# Patient Record
Sex: FEMALE | Race: WHITE | NOT HISPANIC OR LATINO | Employment: FULL TIME | URBAN - METROPOLITAN AREA
[De-identification: names, ages, dates, MRNs, and addresses within clinical notes are randomized per-mention and may not be internally consistent; named-entity substitution may affect disease eponyms.]

---

## 2017-02-23 ENCOUNTER — HOSPITAL ENCOUNTER (EMERGENCY)
Facility: OTHER | Age: 42
Discharge: HOME | End: 2017-02-23
Attending: EMERGENCY MEDICINE
Payer: COMMERCIAL

## 2017-02-23 VITALS
OXYGEN SATURATION: 100 % | DIASTOLIC BLOOD PRESSURE: 105 MMHG | RESPIRATION RATE: 20 BRPM | TEMPERATURE: 99 F | SYSTOLIC BLOOD PRESSURE: 190 MMHG | HEART RATE: 78 BPM

## 2017-02-23 DIAGNOSIS — S05.02XA CONJUNCTIVAL ABRASION, LEFT, INITIAL ENCOUNTER: Primary | ICD-10-CM

## 2017-02-23 PROCEDURE — 99283 EMERGENCY DEPT VISIT LOW MDM: CPT

## 2017-02-23 PROCEDURE — 90471 IMMUNIZATION ADMIN: CPT | Performed by: EMERGENCY MEDICINE

## 2017-02-23 PROCEDURE — 63600175 PHARM REV CODE 636 W HCPCS: Performed by: EMERGENCY MEDICINE

## 2017-02-23 PROCEDURE — 25000003 PHARM REV CODE 250: Performed by: EMERGENCY MEDICINE

## 2017-02-23 PROCEDURE — 90715 TDAP VACCINE 7 YRS/> IM: CPT | Performed by: EMERGENCY MEDICINE

## 2017-02-23 PROCEDURE — 25000003 PHARM REV CODE 250

## 2017-02-23 RX ORDER — GENTAMICIN SULFATE 3 MG/ML
1 SOLUTION/ DROPS OPHTHALMIC
Status: COMPLETED | OUTPATIENT
Start: 2017-02-23 | End: 2017-02-23

## 2017-02-23 RX ORDER — ZOLPIDEM TARTRATE 5 MG/1
5 TABLET ORAL NIGHTLY PRN
COMMUNITY
End: 2017-08-29 | Stop reason: DRUGHIGH

## 2017-02-23 RX ORDER — AMLODIPINE BESYLATE 5 MG/1
5 TABLET ORAL DAILY
COMMUNITY
End: 2017-06-04 | Stop reason: ALTCHOICE

## 2017-02-23 RX ORDER — ATORVASTATIN CALCIUM 10 MG/1
10 TABLET, FILM COATED ORAL DAILY
COMMUNITY

## 2017-02-23 RX ORDER — ESCITALOPRAM OXALATE 20 MG/1
20 TABLET ORAL DAILY
COMMUNITY

## 2017-02-23 RX ORDER — PROPARACAINE HYDROCHLORIDE 5 MG/ML
1 SOLUTION/ DROPS OPHTHALMIC
Status: COMPLETED | OUTPATIENT
Start: 2017-02-23 | End: 2017-02-23

## 2017-02-23 RX ORDER — PROPARACAINE HYDROCHLORIDE 5 MG/ML
SOLUTION/ DROPS OPHTHALMIC
Status: COMPLETED
Start: 2017-02-23 | End: 2017-02-23

## 2017-02-23 RX ORDER — CETIRIZINE HYDROCHLORIDE 5 MG/1
5 TABLET ORAL DAILY
COMMUNITY

## 2017-02-23 RX ADMIN — CLOSTRIDIUM TETANI TOXOID ANTIGEN (FORMALDEHYDE INACTIVATED), CORYNEBACTERIUM DIPHTHERIAE TOXOID ANTIGEN (FORMALDEHYDE INACTIVATED), BORDETELLA PERTUSSIS TOXOID ANTIGEN (GLUTARALDEHYDE INACTIVATED), BORDETELLA PERTUSSIS FILAMENTOUS HEMAGGLUTININ ANTIGEN (FORMALDEHYDE INACTIVATED), BORDETELLA PERTUSSIS PERTACTIN ANTIGEN, AND BORDETELLA PERTUSSIS FIMBRIAE 2/3 ANTIGEN 0.5 ML: 5; 2; 2.5; 5; 3; 5 INJECTION, SUSPENSION INTRAMUSCULAR at 09:02

## 2017-02-23 RX ADMIN — GENTAMICIN SULFATE 1 DROP: 3 SOLUTION/ DROPS OPHTHALMIC at 10:02

## 2017-02-23 RX ADMIN — PROPARACAINE HYDROCHLORIDE: 5 SOLUTION/ DROPS OPHTHALMIC at 10:02

## 2017-02-23 RX ADMIN — FLUORESCEIN SODIUM: 1 STRIP OPHTHALMIC at 10:02

## 2017-02-23 RX ADMIN — PROPARACAINE HYDROCHLORIDE 1 DROP: 5 SOLUTION/ DROPS OPHTHALMIC at 10:02

## 2017-02-23 NOTE — ED AVS SNAPSHOT
[facility] EMERGENCY DEPARTMENT  [address]  [place] [address]               Reema Barrios   2017  9:22 PM   ED    Description:  Female : 1975   Department:  [facility] Emergency Department           Your Care was Coordinated By:     Provider Role From To    Nargis Smith MD Attending Provider 17 2163 --      Reason for Visit     Eye Injury           Diagnoses this Visit        Comments    Conjunctival abrasion, left, initial encounter    -  Primary       ED Disposition     ED Disposition Condition Comment    Discharge             To Do List           Follow-up Information     Follow up with Your ophthalmologist. Schedule an appointment as soon as possible for a visit in 2 days.    Why:  For recheck      [facility] On Call     [facility] On Call Nurse Care Line -  Assistance  Registered nurses in the [facility] On Call Center provide clinical advisement, health education, appointment booking, and other advisory services.  Call for this free service at [phone].             Medications           Message regarding Medications     Verify the changes and/or additions to your medication regime listed below are the same as discussed with your clinician today.  If any of these changes or additions are incorrect, please notify your healthcare provider.        These medications were administered today        Dose Freq    Tdap vaccine injection 0.5 mL 0.5 mL vaccine x 1 dose    Sig: Inject 0.5 mLs into the muscle vaccine x 1 dose for Meets Vaccination Criteria.    Class: Normal    Route: Intramuscular    fluorescein (FLUOR-I-STRIPS A.T.) 1 mg ophthalmic strip      Notes to Pharmacy: Created by cabinet override    proparacaine (ALCAINE) 0.5 % ophthalmic solution      Notes to Pharmacy: Created by cabinet override    proparacaine 0.5 % ophthalmic solution 1 drop 1 drop ED 1 Time    Sig: Place 1 drop into the left eye ED 1 Time.    Class: Normal    Route: Left Eye    fluorescein  ophthalmic strip 1 strip 1 strip ED 1 Time    Sig: Place 1 strip into the left eye ED 1 Time.    Class: Normal    Route: Left Eye    fluorescein (FLUOR-I-STRIPS A.T.) 1 mg ophthalmic strip      Notes to Pharmacy: Created by cabinet override    gentamicin 0.3 % ophthalmic solution 1 drop 1 drop ED 1 Time    Sig: Place 1 drop into the left eye ED 1 Time.    Class: Normal    Route: Left Eye           Verify that the below list of medications is an accurate representation of the medications you are currently taking.  If none reported, the list may be blank. If incorrect, please contact your healthcare provider. Carry this list with you in case of emergency.           Current Medications     amlodipine (NORVASC) 5 MG tablet Take 5 mg by mouth once daily.    atorvastatin (LIPITOR) 10 MG tablet Take 10 mg by mouth once daily.    cetirizine (ZYRTEC) 5 MG tablet Take 5 mg by mouth once daily.    escitalopram oxalate (LEXAPRO) 20 MG tablet Take 20 mg by mouth once daily.    zolpidem (AMBIEN) 5 MG Tab Take 5 mg by mouth nightly as needed.    fluorescein (FLUOR-I-STRIPS A.T.) 1 mg ophthalmic strip     gentamicin 0.3 % ophthalmic solution 1 drop Place 1 drop into the left eye ED 1 Time.    proparacaine 0.5 % ophthalmic solution 1 drop Place 1 drop into the left eye ED 1 Time.    Tdap vaccine injection 0.5 mL Inject 0.5 mLs into the muscle vaccine x 1 dose for Meets Vaccination Criteria.           Clinical Reference Information           Your Vitals Were     BP                   190/105           Allergies as of 2/23/2017     No Known Allergies      Immunizations Administered on Date of Encounter - 2/23/2017     Name Date Dose VIS Date Route    TDAP  Incomplete 0.5 mL 2/24/2015 Intramuscular    TDAP 2/23/2017 0.5 mL 2/24/2015 Intramuscular      ED Micro, Lab, POCT     None      ED Imaging Orders     None        Discharge Instructions       Garamycin drops one drop to the left eye 4 times a day for the next 5-7 days.  Appointment  with Your Ophthalmologist the next day or two for recheck.  Return for worsening condition.  He can patch the eye for the next day or 2 as needed for comfort.    Discharge References/Attachments     CONJUNCTIVAL FOREIGN BODY, RESOLVED (ENGLISH)      [facility] Sign-Up     Activating your [facility] account is as easy as 1-2-3!     1) Visit [email or web address], select Sign Up Now, enter this activation code and your date of birth, then select Next.  [email or web address]  Expires: 4/9/2017 10:50 PM      2) Create a username and password to use when you visit [facility] in the future and select a security question in case you lose your password and select Next.    3) Enter your e-mail address and click Sign Up!    Additional Information  If you have questions, please e-mail [email or web address] or call [phone] to talk to our [facility] staff. Remember, [facility] is NOT to be used for urgent needs. For medical emergencies, dial 911.          [facility] Emergency Department complies with applicable Federal civil rights laws and does not discriminate on the basis of race, color, national origin, age, disability, or sex.        Language Assistance Services     ATTENTION: Language assistance services are available, free of charge. Please call [phone].      ATENCIÓN: Si habla español, tiene a ortiz disposición servicios gratuitos de asistencia lingüística. Llame al [phone].     CHÚ Ý: N?u b?n nói Ti?ng Vi?t, có các d?ch v? h? tr? ngôn ng? mi?n phí dành cho b?n. G?i s? [phone].

## 2017-02-24 NOTE — ED PROVIDER NOTES
Encounter Date: 2017       History     Chief Complaint   Patient presents with    Eye Injury     Review of patient's allergies indicates:  No Known Allergies  HPI Comments: 1-year-old female reports her left eye was irritated and when she rubbed it she thinks she scratched the eyeball, because the conjunctiva became very swollen.  She reports she was able take her contact out of that eye without any difficulty.  Mild blurred vision.  No bleeding.  Last tetanus is unknown.    Patient is a 41 y.o. female presenting with the following complaint: eye pain. The history is provided by the patient.   Eye Pain    This is a new problem. The current episode started just prior to arrival. The left eye is affected. The injury mechanism was a direct trauma. There is history of trauma to the eye. She wears contacts. Associated symptoms include blurred vision, discharge and itching. Pertinent negatives include no double vision, no foreign body sensation, no photophobia, no eye redness and no nausea.     Past Medical History:   Diagnosis Date    Anxiety     DDD (degenerative disc disease), cervical     High cholesterol     Hypertension     Insomnia      Past Surgical History:   Procedure Laterality Date    CARPAL TUNNEL RELEASE Bilateral      SECTION       History reviewed. No pertinent family history.  Social History   Substance Use Topics    Smoking status: Never Smoker    Smokeless tobacco: None    Alcohol use No     Review of Systems   Constitutional: Negative for chills and fever.   Eyes: Positive for blurred vision, pain and discharge. Negative for double vision, photophobia and redness.   Gastrointestinal: Negative for nausea.   Skin: Positive for itching.       Physical Exam   Initial Vitals   BP Pulse Resp Temp SpO2   17 2123 17 2123 173 17   190/105 78 20 98.8 °F (37.1 °C) 100 %     Physical Exam    Nursing note and vitals reviewed.  Constitutional:  She appears well-developed and well-nourished. She is cooperative.   HENT:   Head: Normocephalic.   Eyes: EOM and lids are normal. Pupils are equal, round, and reactive to light. Right conjunctiva is not injected. Right conjunctiva has no hemorrhage. Left conjunctiva is not injected. Left conjunctiva has no hemorrhage.   Slit lamp exam:       The left eye shows no corneal abrasion, no foreign body and no fluorescein uptake.       Neck: Phonation normal. Neck supple.   Neurological: She is alert.   Skin: Skin is warm and dry.         ED Course   Procedures  Labs Reviewed - No data to display                            ED Course     Clinical Impression:   The encounter diagnosis was Conjunctival abrasion, left, initial encounter.    Disposition:   Disposition: Discharged  Condition: Stable       Nargis Smith MD  02/24/17 0353

## 2017-02-24 NOTE — DISCHARGE INSTRUCTIONS
Garamycin drops one drop to the left eye 4 times a day for the next 5-7 days.  Appointment with Your Ophthalmologist the next day or two for recheck.  Return for worsening condition.  He can patch the eye for the next day or 2 as needed for comfort.

## 2017-02-24 NOTE — ED NOTES
Registration reports pt with eye injury / nurse examined in waiting room and moved pt to room #2 for MD evaluation due to swelling of left eye covering (OS)

## 2017-06-04 ENCOUNTER — HOSPITAL ENCOUNTER (EMERGENCY)
Facility: HOSPITAL | Age: 42
Discharge: HOME | End: 2017-06-04
Attending: EMERGENCY MEDICINE | Admitting: EMERGENCY MEDICINE
Payer: COMMERCIAL

## 2017-06-04 VITALS
TEMPERATURE: 98 F | HEART RATE: 100 BPM | WEIGHT: 225 LBS | DIASTOLIC BLOOD PRESSURE: 86 MMHG | SYSTOLIC BLOOD PRESSURE: 128 MMHG | OXYGEN SATURATION: 99 % | BODY MASS INDEX: 33.33 KG/M2 | HEIGHT: 69 IN | RESPIRATION RATE: 20 BRPM
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
## 2017-06-04 DIAGNOSIS — S82.891A MALLEOLAR FRACTURE, RIGHT, CLOSED, INITIAL ENCOUNTER: Primary | ICD-10-CM

## 2017-06-04 DIAGNOSIS — S99.919A ANKLE INJURY: ICD-10-CM

## 2017-06-04 DIAGNOSIS — M79.673 FOOT PAIN: ICD-10-CM

## 2017-06-04 PROCEDURE — 29515 APPLICATION SHORT LEG SPLINT: CPT | Mod: RT

## 2017-06-04 PROCEDURE — 27767 CLTX POST ANKLE FX: CPT | Mod: 54,,, | Performed by: EMERGENCY MEDICINE

## 2017-06-04 PROCEDURE — 99284 EMERGENCY DEPT VISIT MOD MDM: CPT | Mod: 25

## 2017-06-04 PROCEDURE — 25000003 PHARM REV CODE 250: Performed by: PHYSICIAN ASSISTANT

## 2017-06-04 PROCEDURE — 99284 EMERGENCY DEPT VISIT MOD MDM: CPT | Mod: 57,,, | Performed by: PHYSICIAN ASSISTANT

## 2017-06-04 RX ORDER — LISINOPRIL 10 MG/1
10 TABLET ORAL DAILY
COMMUNITY
End: 2017-08-29 | Stop reason: ALTCHOICE

## 2017-06-04 RX ORDER — HYDROCODONE BITARTRATE AND ACETAMINOPHEN 5; 325 MG/1; MG/1
1 TABLET ORAL
Status: COMPLETED | OUTPATIENT
Start: 2017-06-04 | End: 2017-06-04

## 2017-06-04 RX ORDER — HYDROCODONE BITARTRATE AND ACETAMINOPHEN 5; 325 MG/1; MG/1
1 TABLET ORAL EVERY 4 HOURS PRN
Qty: 15 TABLET | Refills: 0 | Status: SHIPPED | OUTPATIENT
Start: 2017-06-04 | End: 2017-08-29

## 2017-06-04 RX ADMIN — HYDROCODONE BITARTRATE AND ACETAMINOPHEN 1 TABLET: 5; 325 TABLET ORAL at 03:06

## 2017-06-04 NOTE — ED NOTES
Patient identifiers verified and correct for Reema Barrios.    LOC: The patient is awake, alert and aware of environment with an appropriate affect, the patient is oriented x 3 and speaking appropriately.  APPEARANCE: Patient resting comfortably and in no acute distress, patient is clean and well groomed, patient's clothing is properly fastened.  SKIN: The skin is warm and dry, color consistent with ethnicity, patient has normal skin turgor and moist mucus membranes, skin intact, no breakdown or bruising noted.  MUSCULOSKELETAL: Patient moving all extremities spontaneously except for right ankle which has bruising and swelling from fall - limited ROM secondary to pain.   RESPIRATORY: Airway is open and patent, respirations are spontaneous, patient has a normal effort and rate, no accessory muscle use noted, bilateral breath sounds clear.  CARDIAC: Patient has a normal rate and regular rhythm, no periphreal edema noted, capillary refill < 3 seconds.  ABDOMEN: Soft and non tender to palpation, no distention noted, normoactive bowel sounds present in all four quadrants.  NEUROLOGIC: PERRL, 3 mm bilaterally, eyes open spontaneously, behavior appropriate to situation, follows commands, facial expression symmetrical, bilateral hand grasp equal and even, purposeful motor response noted, normal sensation in all extremities when touched with a finger.

## 2017-06-04 NOTE — ED TRIAGE NOTES
Pt was walking down staircase and stopped for a person in front of her and she fell backwards onto right foot. Swelling and bruising noted. Unable to bear weight. Good pedal pulses and sensation noted.

## 2017-06-04 NOTE — ED PROVIDER NOTES
Encounter Date: 2017       History     Chief Complaint   Patient presents with    Ankle Pain     right ankle; fell on it      Review of patient's allergies indicates:  No Known Allergies  Patient is a 41-year-old female with history of hypertension and hyperlipidemia who presents to the emergency department with right ankle pain.  Patient states she was at a dance revue with her daughter.  Patient states she was walking on the stairs, when she tripped falling onto her ankle.  Patient states she hurt her ankle pop.  Patient reports swelling and pain to the right ankle.  Patient states she can wiggle her toes, but there is pain with bearing weight.  Patient denies falling or hitting her head.  Patient denies any other injury.      The history is provided by the patient.     Past Medical History:   Diagnosis Date    Anxiety     DDD (degenerative disc disease), cervical     High cholesterol     Hypertension     Insomnia      Past Surgical History:   Procedure Laterality Date    CARPAL TUNNEL RELEASE Bilateral      SECTION       History reviewed. No pertinent family history.  Social History   Substance Use Topics    Smoking status: Never Smoker    Smokeless tobacco: Not on file    Alcohol use No     Review of Systems   Constitutional: Negative for activity change, appetite change, chills, fatigue and fever.   HENT: Negative for congestion, dental problem, facial swelling, nosebleeds, postnasal drip, sore throat and trouble swallowing.    Respiratory: Negative for cough and shortness of breath.    Cardiovascular: Negative for chest pain.   Gastrointestinal: Negative for abdominal pain, blood in stool, constipation, diarrhea, nausea and vomiting.   Genitourinary: Negative for dysuria, flank pain and hematuria.   Musculoskeletal: Negative for back pain, neck pain and neck stiffness.        Ankle pain   Skin: Negative for rash and wound.   Neurological: Negative for dizziness, speech difficulty,  weakness, light-headedness, numbness and headaches.       Physical Exam     Initial Vitals [06/04/17 1503]   BP Pulse Resp Temp SpO2   128/86 100 20 97.9 °F (36.6 °C) 99 %     Physical Exam    Nursing note and vitals reviewed.  Constitutional: She appears well-developed and well-nourished. She is not diaphoretic.  Non-toxic appearance. No distress.   HENT:   Head: Normocephalic.   Right Ear: Hearing and external ear normal.   Left Ear: Hearing and external ear normal.   Nose: Nose normal.   Mouth/Throat: Uvula is midline and oropharynx is clear and moist. No trismus in the jaw. No uvula swelling. No oropharyngeal exudate.   Eyes: Conjunctivae are normal. Pupils are equal, round, and reactive to light.   Neck: Normal range of motion.   Cardiovascular: Normal rate and regular rhythm.   Pulmonary/Chest: Breath sounds normal. No respiratory distress. She has no wheezes. She has no rhonchi. She has no rales. She exhibits no tenderness.   Abdominal: Soft. Bowel sounds are normal. She exhibits no distension and no mass. There is no tenderness. There is no rebound and no guarding.   Musculoskeletal:        Right ankle: She exhibits decreased range of motion and swelling. Tenderness. Lateral malleolus and medial malleolus tenderness found. No head of 5th metatarsal and no proximal fibula tenderness found. Achilles tendon exhibits normal Logan's test results.   Lymphadenopathy:     She has no cervical adenopathy.   Neurological: She is alert and oriented to person, place, and time.   Skin: Skin is warm and dry. Capillary refill takes less than 2 seconds.   Psychiatric: She has a normal mood and affect.         ED Course   Splint Application  Date/Time: 6/4/2017 4:33 PM  Performed by: JENNIFER FLORES  Authorized by: HUGO METZGER   Splint type: ankle stirrup  Post-procedure: The splinted body part was neurovascularly unchanged following the procedure.  Patient tolerance: Patient tolerated the  procedure well with no immediate complications        Labs Reviewed - No data to display       X-Rays:   Independently Interpreted Readings:   Other Readings:  Posterior malleolar fracture    Imaging Results          X-Ray Ankle Complete Right (Final result)  Result time 06/04/17 16:28:13    Final result by Dontae Yusuf III, MD (06/04/17 16:28:13)                 Narrative:    3 views: There is a fracture of the posterior malleolus.  There is DJD and Spurs on the calcaneus.      Electronically signed by: DONTAE YUSUF  Date:     06/04/17  Time:    16:28                              X-Ray Foot Complete Right (Final result)  Result time 06/04/17 16:28:29    Final result by Dontae Yusuf III, MD (06/04/17 16:28:29)                 Narrative:    3 views: There is a posterior malleolar fracture, DJD, and spurs on the calcaneus.  There is a hallux valgus deformity.      Electronically signed by: DONTAE YUSUF  Date:     06/04/17  Time:    16:28                               Medical Decision Making:   Initial Assessment:   Urgent evaluation of a 41-year-old female with history of hypertension and hyperlipidemia who presents to the emergency department with ankle pain.  Patient is afebrile, nontoxic appearing, and hemodynamically stable.  On exam, there is swelling to the right ankle.  There is tenderness over both malleoli.  X-rays will be obtained.  Patient be given analgesic.  ED Management:  4:33 PM  X-rays reveal posterior malleolar fracture.  Patient is placed in ankle stirrup splint.  Patient is given crutches.  She is advised to follow-up with Ortho Evra return to the emergency department with any worsening symptoms or concerns.  Other:   I have discussed this case with another health care provider.       <> Summary of the Discussion: Dr. Carrillo              Attending Attestation:     Physician Attestation Statement for NP/PA:   I discussed this assessment and plan of this patient with the NP/PA, but I did  not personally examine the patient. The face to face encounter was performed by the NP/PA.                  ED Course     Clinical Impression:   The primary encounter diagnosis was Malleolar fracture, right, closed, initial encounter. Diagnoses of Ankle injury and Foot pain were also pertinent to this visit.    Disposition:   Disposition: Discharged  Condition: Stable       Salud Gutiérrez PA-C  06/04/17 1636       Refugio Rebolledo MD  06/21/17 1532

## 2017-08-20 ENCOUNTER — DOCUMENTATION ONLY (OUTPATIENT)
Dept: BARIATRICS | Facility: CLINIC | Age: 42
End: 2017-08-20

## 2017-08-20 ENCOUNTER — TELEPHONE (OUTPATIENT)
Dept: BARIATRICS | Facility: CLINIC | Age: 42
End: 2017-08-20

## 2017-08-20 NOTE — PROGRESS NOTES
Bariatric Surgery Online Course Form Submission  Someone has submitted a Bariatric Surgery Online Course Form Submission on this page.  Date: 2017-08-20 - 14:45  Patient's Name: Reema Barrios  YOB: 1975 Email: [email or web address]  Phone: [phone]   Patient Address: [address] [place] [address]  [place] [id number]  Preferred Surgical Location: [facility]   I certify that I am 18 years of age or older:   Confirmation Code: [id number]  Verification of Bariatric Seminar: y  Insurance Information  Insurance or Self Pay? Insurance - Fill out fields below  Insurance Company Name: Blue Cross Blue Shield   Type of Coverage/Coverage Plan (i.e. PPO, HMO): PPO   Group Name: [employer]   Subscriber Name: Kyle [place]   Member Name (patient's name)   Member ID/Policy #:[id number]   Plan Effective Date: 2008  Card Issuance date:

## 2017-08-20 NOTE — TELEPHONE ENCOUNTER
TTP and obtained ht/wt and she's bordering criteria as unsure of exact height.  If taller then no criteria for surgery and medical wt loss can be offered. Pt . Understands we'll need to weigh and measure her ht on arrival.

## 2017-08-29 ENCOUNTER — OFFICE VISIT (OUTPATIENT)
Dept: BARIATRICS | Facility: CLINIC | Age: 42
End: 2017-08-29
Payer: COMMERCIAL

## 2017-08-29 VITALS
HEART RATE: 76 BPM | BODY MASS INDEX: 34.68 KG/M2 | HEIGHT: 68 IN | SYSTOLIC BLOOD PRESSURE: 135 MMHG | WEIGHT: 228.81 LBS | DIASTOLIC BLOOD PRESSURE: 83 MMHG

## 2017-08-29 DIAGNOSIS — Z01.811 PRE-OP CHEST EXAM: ICD-10-CM

## 2017-08-29 DIAGNOSIS — R40.0 DAYTIME SOMNOLENCE: Primary | ICD-10-CM

## 2017-08-29 DIAGNOSIS — E66.01 SEVERE OBESITY (BMI 35.0-39.9) WITH COMORBIDITY: ICD-10-CM

## 2017-08-29 DIAGNOSIS — E78.2 MIXED HYPERLIPIDEMIA: ICD-10-CM

## 2017-08-29 DIAGNOSIS — I10 ESSENTIAL HYPERTENSION: ICD-10-CM

## 2017-08-29 DIAGNOSIS — R12 HEARTBURN: ICD-10-CM

## 2017-08-29 DIAGNOSIS — E28.2 PCOS (POLYCYSTIC OVARIAN SYNDROME): ICD-10-CM

## 2017-08-29 PROCEDURE — 3008F BODY MASS INDEX DOCD: CPT | Mod: S$GLB,,, | Performed by: PHYSICIAN ASSISTANT

## 2017-08-29 PROCEDURE — 99204 OFFICE O/P NEW MOD 45 MIN: CPT | Mod: S$GLB,,, | Performed by: PHYSICIAN ASSISTANT

## 2017-08-29 PROCEDURE — 99999 PR PBB SHADOW E&M-EST. PATIENT-LVL V: CPT | Mod: PBBFAC,,, | Performed by: PHYSICIAN ASSISTANT

## 2017-08-29 RX ORDER — LOSARTAN POTASSIUM 50 MG/1
50 TABLET ORAL DAILY
COMMUNITY
Start: 2017-07-28

## 2017-08-29 RX ORDER — ZOLPIDEM TARTRATE 10 MG/1
10 TABLET ORAL NIGHTLY
COMMUNITY
Start: 2017-08-23

## 2017-08-29 RX ORDER — OMEPRAZOLE 20 MG/1
20 CAPSULE, DELAYED RELEASE ORAL DAILY PRN
COMMUNITY
Start: 2017-06-01

## 2017-08-29 NOTE — PROGRESS NOTES
BARIATRIC NEW PATIENT EVALUATION    CHIEF COMPLAINT:   Severe obesity Body mass index is 35.05 kg/m². and inability to maintain weight loss.    HISTORY OF PRESENT ILLNESS:  Reema Barrios  is a 42 y.o. female presenting for severe obesity Body mass index is 35.05 kg/m². and inability to maintain weight loss. The patient has tried Weight Watchers, and diet and exercise.  She reports that she has been overweight since childhood. She reports that she has steadily gained weight over the years.  She reports that she only been able to lose about 30-40 pounds with diet and exercise.  She is currently at her heaviest adult weight and has had maintained this weight for the past 9 years (after her second child).        PAST MEDICAL HISTORY:  Past Medical History:   Diagnosis Date    Anxiety     DDD (degenerative disc disease), cervical     Depression     Heartburn     about every 2 weeks    High cholesterol     Hypertension     Insomnia          PAST SURGICAL HISTORY:  Past Surgical History:   Procedure Laterality Date    CARPAL TUNNEL RELEASE Bilateral      SECTION      x2    INGUINAL HERNIA REPAIR Bilateral     TUBAL LIGATION         FAMILY HISTORY:  Family History   Problem Relation Age of Onset    Hypertension Mother     Hyperlipidemia Mother     Cirrhosis Father     Heart disease Maternal Grandmother     Heart disease Maternal Grandfather     Tuberculosis Paternal Grandmother     Diabetes Paternal Grandfather        SOCIAL HISTORY:  Social History     Social History    Marital status:      Spouse name: N/A    Number of children: N/A    Years of education: N/A     Occupational History    Not on file.     Social History Main Topics    Smoking status: Never Smoker    Smokeless tobacco: Never Used    Alcohol use No    Drug use: No    Sexual activity: Yes     Partners: Male     Birth control/ protection: See Surgical Hx     Other Topics Concern    Not on file     Social  "History Narrative    No narrative on file       MEDICATIONS:  Current Outpatient Prescriptions   Medication Sig Dispense Refill    atorvastatin (LIPITOR) 10 MG tablet Take 10 mg by mouth once daily.      cetirizine (ZYRTEC) 5 MG tablet Take 5 mg by mouth once daily.      escitalopram oxalate (LEXAPRO) 20 MG tablet Take 20 mg by mouth once daily.      losartan (COZAAR) 50 MG tablet 50 mg once daily.       omeprazole (PRILOSEC) 20 MG capsule Take 20 mg by mouth daily as needed.       zolpidem (AMBIEN) 10 mg Tab Take 10 mg by mouth every evening.        No current facility-administered medications for this visit.        ALLERGIES:  Review of patient's allergies indicates:   Allergen Reactions    Percocet [oxycodone-acetaminophen] Itching       ROS:  Review of Systems   Constitutional: Positive for malaise/fatigue. Negative for chills, diaphoresis, fever and weight loss.   Eyes: Negative for blurred vision, double vision and pain.   Respiratory: Negative for cough, shortness of breath and wheezing.    Cardiovascular: Negative for chest pain, palpitations and leg swelling.   Gastrointestinal: Negative for abdominal pain, blood in stool, constipation, diarrhea, heartburn, melena, nausea and vomiting.   Genitourinary: Negative for dysuria, frequency and hematuria.   Skin: Negative for itching and rash.   Neurological: Negative for weakness and headaches.   Psychiatric/Behavioral: Negative for depression, hallucinations, memory loss, substance abuse and suicidal ideas. The patient has insomnia. The patient is not nervous/anxious.        PE:  Vitals:    08/29/17 1253   BP: 135/83   Pulse: 76   Weight: 103.8 kg (228 lb 13.4 oz)   Height: 5' 7.75" (1.721 m)       Physical Exam   Constitutional: She is oriented to person, place, and time. She appears well-developed and well-nourished. No distress.   Morbidly obese   HENT:   Head: Normocephalic and atraumatic.   Eyes: Conjunctivae are normal. Right eye exhibits no " discharge. Left eye exhibits no discharge. No scleral icterus.   Neck: Neck supple.   Cardiovascular: Normal rate, regular rhythm and normal heart sounds.  Exam reveals no gallop and no friction rub.    No murmur heard.  Pulmonary/Chest: Effort normal and breath sounds normal. No respiratory distress. She has no wheezes. She has no rales.   Abdominal: Soft. Bowel sounds are normal. She exhibits no distension and no mass. There is no tenderness. There is no rebound and no guarding. No hernia.   Musculoskeletal: Normal range of motion. She exhibits no edema.   Neurological: She is alert and oriented to person, place, and time.   Skin: Skin is warm, dry and intact. No rash noted. She is not diaphoretic. No erythema. No pallor.   Psychiatric: She has a normal mood and affect. Her speech is normal and behavior is normal. Judgment and thought content normal.   Nursing note and vitals reviewed.       DIAGNOSIS:  1. Severe obesity with Body mass index is 35.05 kg/m². and inability to maintain weight loss.  2. Co-morbidities: hypertension, hyperlipidemia, and PCOS.  3. Possible MARCELA, to have sleep study/ sleep MD consult.    PLAN:  The patient is a good candidate for Bariatric Surgery. she is interested in sleeve gastrectomy with Dr. Broderick. The surgery and post-op care were discussed in detail with the patient. All questions were answered.    she understands that bariatric surgery is a tool to aid in weight loss and that she needs to be committed to the diet and exercise post-operatively for successful weight loss.  Discussed expected weight loss outcomes after surgery which is 50% of the excess weight on her frame.  Goal weight is 180'#s.  Discussed with patient that bariatric surgery is not the easy way out and that it will take plenty of dedication on the patient's part to be successful. Also discussed the possibility of weight regain if the patient strays from the diet guidelines or exercise requirements. Patient  verbalized understanding and wishes to proceed with the work-up.     ORDERS:  1. Chest X-Ray and EKG  2. Psychological Consult, Bariatric Dietician Consult, PCP Clearance and Sleep MD consult  3. Bariatric Labs: BMP, CBC, Folate Serum, H. pylori, HgA1C, Hepatic Panel/LFT, Iron & TIBC, Lipid Profile, Magnesium, Phosphate, T3, T4, TSH, Free T4, Vitamin B12, and Vitamin B1.    Primary Physician: Dakotah Sandy MD  RTC: As scheduled.    Blue packet reviewed, pertinent information entered in Epic.    45 minute visit, over 50% of time spent counseling patient face to face on surgical options, risks, benefits, expected diet, recommended exercise regimen, and expected weight loss.

## 2017-08-29 NOTE — LETTER
[facility]  [address]  [place] [address]  Phone: [phone]  Fax: [phone] August 29, 2017      Dakotah Sandy MD  [address] [facility] [place] [address]    Patient: Reema Barrios   MR Number: [id number]   YOB: 1975   Date of Visit: 8/29/2017     Dear Dr. Sandy:    Thank you for referring Reema Barrios to me for evaluation. Below are the relevant portions of my assessment and plan of care.    HISTORY OF PRESENT ILLNESS:  Reema Barrios  is a 42 y.o. female presenting for severe obesity Body mass index is 35.05 kg/m² and inability to maintain weight loss. The patient has tried Weight Watchers, and diet and exercise.  She reports that she has been overweight since childhood. She reports that she has steadily gained weight over the years. She reports that she only been able to lose about 30-40 pounds with diet and exercise.  She is currently at her heaviest adult weight and has had maintained this weight for the past 9 years (after her second child).      DIAGNOSIS:  1. Severe obesity with Body mass index is 35.05 kg/m². and inability to maintain weight loss.  2. Co-morbidities: hypertension, hyperlipidemia, and PCOS.  3. Possible MARCELA, to have sleep study/ sleep MD consult.     PLAN:  The patient is a good candidate for Bariatric Surgery. she is interested in sleeve gastrectomy with Dr. Broderick. The surgery and post-op care were discussed in detail with the patient. All questions were answered.     She understands that bariatric surgery is a tool to aid in weight loss and that she needs to be committed to the diet and exercise post-operatively for successful weight loss.  Discussed expected weight loss outcomes after surgery which is 50% of the excess weight on her frame.  Goal weight is 180'#s.  Discussed with patient that bariatric surgery is not the easy way out and that it will take plenty of dedication on the patient's part to be successful.  Also discussed the possibility of weight regain if the patient strays from the diet guidelines or exercise requirements. Patient verbalized understanding and wishes to proceed with the work-up.      ORDERS:  1. Chest X-Ray and EKG  2. Psychological Consult, Bariatric Dietician Consult, PCP Clearance and Sleep MD consult  3. Bariatric Labs: BMP, CBC, Folate Serum, H. pylori, HgA1C, Hepatic Panel/LFT, Iron & TIBC, Lipid Profile, Magnesium, Phosphate, T3, T4, TSH, Free T4, Vitamin B12, and Vitamin B1.    If you have questions, please do not hesitate to call me. I look forward to following Reema along with you.    Sincerely,      Estefani Feliciano PA-C   Section of Bariatric Surgery  [facility]    MINDI/nanda

## 2017-08-29 NOTE — PATIENT INSTRUCTIONS
Prior to surgery you will need to complete:  - Dietitian consult and follow up appointments as needed  - PCP clearance  - Labs  - Chest X-ray  - EKG  - Psychological evaluation, Please call psychiatry [phone] to schedule  - Sleep Study/ Sleep MD    In preparation for bariatric surgery, please complete the following:   · Discuss your current medications with your primary care provider, remember your medications will need to be crushed, chewable, or in liquid form for the first 3-6 months after a gastric bypass or sleeve.  For a gastric band, your medications will need to be crushed indefinitely.    · If you take a blood thinner such as: Coumadin (warfarin), Pradaxa (dabigatran), or Plavix (clopidogrel), you will need to speak with your prescribing provider on how or if this medication can be stopped before surgery.   · If you take a medication for depression or anxiety, you will need to begin crushing or opening the capsule 1-3 months prior to surgery.  Remember to discuss this with the psychologist or psychiatrist that you see.   · If you take medication for arthritis on a daily basis that is considered a non-steroidal anti-inflammatory (NSAID), please discuss with your prescribing physician an alternative medication.  After having gastric bypass or gastric sleeve, this group of medications is not appropriate to take due to increased risk of bleeding stomach ulcers.      DEFINITIONS  Appointments: Pre-scheduled meetings or consultations with any physician, advanced practice provider, dietitian, or psychologist, and labs, imaging studies, sleep studies, etc.   Late cancellation: Cancelling an appointment 24-48 hours prior to scheduled time.  No-Show appointment:  is when    You do NOT arrive to your appointment at the time its scheduled.   You call to cancel or cancel via [facility] less than 24 hours in advance of your scheduled appointment   You show up 15 minutes AFTER your scheduled appointment time  without any notification of being late.     POLICY  1. You are allowed up to 3 cancellations for appointments.    After 3 cancellations your case will be placed on hold for 2 months and after that time you can resume the program.   2. You are allowed only 1 no-show for an appointment.    You will be re-scheduled one time and if there is a 2nd no-show at any point, your case will be placed on hold for 3 months.  After 3 months you can resume the program.     3. Upon resuming the program after being placed on hold for either above mentioned reasons, if you have a late cancel or no show for any appointment, the bariatric team will review if youre an appropriate candidate for surgery at the monthly meeting.

## 2017-08-30 ENCOUNTER — HOSPITAL ENCOUNTER (OUTPATIENT)
Dept: RADIOLOGY | Facility: HOSPITAL | Age: 42
Discharge: HOME | End: 2017-08-30
Attending: PHYSICIAN ASSISTANT
Payer: COMMERCIAL

## 2017-08-30 DIAGNOSIS — R12 HEARTBURN: ICD-10-CM

## 2017-08-30 PROCEDURE — 74241 FL UPPER GI W KUB: CPT | Mod: 26,,, | Performed by: RADIOLOGY

## 2017-08-30 PROCEDURE — 74241 FL UPPER GI W KUB: CPT | Mod: TC

## 2017-08-31 ENCOUNTER — LAB VISIT (OUTPATIENT)
Dept: LAB | Facility: HOSPITAL | Age: 42
End: 2017-08-31
Attending: INTERNAL MEDICINE
Payer: COMMERCIAL

## 2017-08-31 ENCOUNTER — OFFICE VISIT (OUTPATIENT)
Dept: SLEEP MEDICINE | Facility: CLINIC | Age: 42
End: 2017-08-31
Payer: COMMERCIAL

## 2017-08-31 VITALS
WEIGHT: 228.63 LBS | BODY MASS INDEX: 35.88 KG/M2 | OXYGEN SATURATION: 98 % | HEART RATE: 84 BPM | HEIGHT: 67 IN | DIASTOLIC BLOOD PRESSURE: 99 MMHG | SYSTOLIC BLOOD PRESSURE: 138 MMHG

## 2017-08-31 DIAGNOSIS — G47.33 OSA (OBSTRUCTIVE SLEEP APNEA): Primary | ICD-10-CM

## 2017-08-31 DIAGNOSIS — G25.81 RESTLESS LEGS SYNDROME (RLS): ICD-10-CM

## 2017-08-31 DIAGNOSIS — E66.9 OBESITY, UNSPECIFIED OBESITY SEVERITY, UNSPECIFIED OBESITY TYPE: ICD-10-CM

## 2017-08-31 DIAGNOSIS — G47.00 INSOMNIA, UNSPECIFIED TYPE: ICD-10-CM

## 2017-08-31 LAB — FERRITIN SERPL-MCNC: 80 NG/ML

## 2017-08-31 PROCEDURE — 82728 ASSAY OF FERRITIN: CPT

## 2017-08-31 PROCEDURE — 36415 COLL VENOUS BLD VENIPUNCTURE: CPT | Mod: PO

## 2017-08-31 PROCEDURE — 99999 PR PBB SHADOW E&M-EST. PATIENT-LVL III: CPT | Mod: PBBFAC,,, | Performed by: INTERNAL MEDICINE

## 2017-08-31 PROCEDURE — 99244 OFF/OP CNSLTJ NEW/EST MOD 40: CPT | Mod: S$GLB,,, | Performed by: INTERNAL MEDICINE

## 2017-08-31 RX ORDER — ROPINIROLE 1 MG/1
1 TABLET, FILM COATED ORAL NIGHTLY
Qty: 30 TABLET | Refills: 1 | Status: SHIPPED | OUTPATIENT
Start: 2017-08-31 | End: 2017-10-29 | Stop reason: SDUPTHER
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
## 2017-08-31 NOTE — PATIENT INSTRUCTIONS
Dona or Huang will contact you to schedule your sleep study. Their number is [phone] (ext 2). The [facility] is located on 7th floor of the [facility].    We will call you when the sleep study results are ready - if you have not heard from us by 2 weeks from the date of the study, please call [phone] (ext 1).    You are advised to abstain from driving should you feel sleepy or drowsy.

## 2017-09-06 ENCOUNTER — TELEPHONE (OUTPATIENT)
Dept: SLEEP MEDICINE | Facility: OTHER | Age: 42
End: 2017-09-06

## 2017-09-11 ENCOUNTER — TELEPHONE (OUTPATIENT)
Dept: PULMONOLOGY | Facility: CLINIC | Age: 42
End: 2017-09-11

## 2017-09-11 NOTE — TELEPHONE ENCOUNTER
----- Message from Tay Lei MD sent at 9/7/2017  4:04 PM CDT -----  Please inform patient that iron level is normal.  No need for iron supplement.

## 2017-09-12 ENCOUNTER — TELEPHONE (OUTPATIENT)
Dept: SLEEP MEDICINE | Facility: OTHER | Age: 42
End: 2017-09-12

## 2017-09-18 ENCOUNTER — TELEPHONE (OUTPATIENT)
Dept: SLEEP MEDICINE | Facility: OTHER | Age: 42
End: 2017-09-18

## 2017-09-21 ENCOUNTER — TELEPHONE (OUTPATIENT)
Dept: SLEEP MEDICINE | Facility: OTHER | Age: 42
End: 2017-09-21

## 2017-10-04 ENCOUNTER — TELEPHONE (OUTPATIENT)
Dept: SLEEP MEDICINE | Facility: OTHER | Age: 42
End: 2017-10-04

## 2017-10-17 ENCOUNTER — TELEPHONE (OUTPATIENT)
Dept: SLEEP MEDICINE | Facility: OTHER | Age: 42
End: 2017-10-17

## 2017-10-27 ENCOUNTER — TELEPHONE (OUTPATIENT)
Dept: SLEEP MEDICINE | Facility: OTHER | Age: 42
End: 2017-10-27

## 2017-10-29 DIAGNOSIS — G25.81 RESTLESS LEGS SYNDROME (RLS): ICD-10-CM

## 2017-10-30 RX ORDER — ROPINIROLE 1 MG/1
1 TABLET, FILM COATED ORAL NIGHTLY
Qty: 30 TABLET | Refills: 1 | Status: SHIPPED | OUTPATIENT
Start: 2017-10-30 | End: 2018-02-12 | Stop reason: SDUPTHER

## 2017-11-27 ENCOUNTER — TELEPHONE (OUTPATIENT)
Dept: SLEEP MEDICINE | Facility: OTHER | Age: 42
End: 2017-11-27

## 2017-12-28 ENCOUNTER — TELEPHONE (OUTPATIENT)
Dept: SLEEP MEDICINE | Facility: OTHER | Age: 42
End: 2017-12-28

## 2018-02-12 DIAGNOSIS — G25.81 RESTLESS LEGS SYNDROME (RLS): ICD-10-CM

## 2018-02-12 RX ORDER — ROPINIROLE 1 MG/1
1 TABLET, FILM COATED ORAL NIGHTLY
Qty: 30 TABLET | Refills: 1 | Status: SHIPPED | OUTPATIENT
Start: 2018-02-12 | End: 2018-04-23 | Stop reason: SDUPTHER

## 2018-04-10 DIAGNOSIS — G25.81 RESTLESS LEGS SYNDROME (RLS): ICD-10-CM

## 2018-04-10 RX ORDER — METFORMIN HYDROCHLORIDE 750 MG/1
TABLET, EXTENDED RELEASE ORAL
COMMUNITY
Start: 2018-03-20

## 2018-04-10 RX ORDER — ROPINIROLE 1 MG/1
1 TABLET, FILM COATED ORAL NIGHTLY
Qty: 30 TABLET | Refills: 1 | OUTPATIENT
Start: 2018-04-10 | End: 2018-05-10

## 2018-04-10 NOTE — TELEPHONE ENCOUNTER
Please confirm with patient that requip is contraindicated with pregnancy.  Please confirm patient pregnancy status.

## 2018-04-23 DIAGNOSIS — G25.81 RESTLESS LEGS SYNDROME (RLS): ICD-10-CM

## 2018-04-23 RX ORDER — ROPINIROLE 1 MG/1
1 TABLET, FILM COATED ORAL NIGHTLY
Qty: 30 TABLET | Refills: 1 | Status: SHIPPED | OUTPATIENT
Start: 2018-04-23 | End: 2018-06-16 | Stop reason: SDUPTHER

## 2018-04-26 DIAGNOSIS — G25.81 RESTLESS LEGS SYNDROME (RLS): ICD-10-CM

## 2018-04-27 RX ORDER — ROPINIROLE 1 MG/1
1 TABLET, FILM COATED ORAL NIGHTLY
Qty: 30 TABLET | Refills: 1 | OUTPATIENT
Start: 2018-04-27 | End: 2018-05-27

## 2018-06-16 DIAGNOSIS — G25.81 RESTLESS LEGS SYNDROME (RLS): ICD-10-CM

## 2018-06-18 RX ORDER — ROPINIROLE 1 MG/1
1 TABLET, FILM COATED ORAL NIGHTLY
Qty: 30 TABLET | Refills: 1 | Status: SHIPPED | OUTPATIENT
Start: 2018-06-18 | End: 2018-07-18

## 2018-10-16 ENCOUNTER — DOCUMENTATION ONLY (OUTPATIENT)
Dept: BARIATRICS | Facility: CLINIC | Age: 43
End: 2018-10-16

## 2020-03-03 ENCOUNTER — OFFICE VISIT (OUTPATIENT)
Dept: URGENT CARE | Facility: CLINIC | Age: 45
End: 2020-03-03
Payer: COMMERCIAL

## 2020-03-03 VITALS
TEMPERATURE: 98 F | HEART RATE: 89 BPM | DIASTOLIC BLOOD PRESSURE: 104 MMHG | SYSTOLIC BLOOD PRESSURE: 139 MMHG | BODY MASS INDEX: 35.71 KG/M2 | WEIGHT: 228 LBS | OXYGEN SATURATION: 96 %

## 2020-03-03 DIAGNOSIS — R68.83 CHILLS: ICD-10-CM

## 2020-03-03 DIAGNOSIS — J20.9 ACUTE BRONCHITIS, UNSPECIFIED ORGANISM: Primary | ICD-10-CM

## 2020-03-03 LAB
CTP QC/QA: YES
FLUAV AG NPH QL: NEGATIVE
FLUBV AG NPH QL: NEGATIVE

## 2020-03-03 PROCEDURE — 99204 PR OFFICE/OUTPT VISIT, NEW, LEVL IV, 45-59 MIN: ICD-10-PCS | Mod: 25,S$GLB,, | Performed by: PHYSICIAN ASSISTANT

## 2020-03-03 PROCEDURE — 87804 POCT INFLUENZA A/B: ICD-10-PCS | Mod: QW,S$GLB,, | Performed by: PHYSICIAN ASSISTANT

## 2020-03-03 PROCEDURE — 99204 OFFICE O/P NEW MOD 45 MIN: CPT | Mod: 25,S$GLB,, | Performed by: PHYSICIAN ASSISTANT

## 2020-03-03 PROCEDURE — 96372 THER/PROPH/DIAG INJ SC/IM: CPT | Mod: S$GLB,,, | Performed by: PHYSICIAN ASSISTANT

## 2020-03-03 PROCEDURE — 96372 PR INJECTION,THERAP/PROPH/DIAG2ST, IM OR SUBCUT: ICD-10-PCS | Mod: S$GLB,,, | Performed by: PHYSICIAN ASSISTANT

## 2020-03-03 PROCEDURE — 87804 INFLUENZA ASSAY W/OPTIC: CPT | Mod: QW,S$GLB,, | Performed by: PHYSICIAN ASSISTANT

## 2020-03-03 RX ORDER — PROMETHAZINE HYDROCHLORIDE AND DEXTROMETHORPHAN HYDROBROMIDE 6.25; 15 MG/5ML; MG/5ML
5 SYRUP ORAL NIGHTLY PRN
Qty: 150 ML | Refills: 0 | Status: SHIPPED | OUTPATIENT
Start: 2020-03-03 | End: 2020-03-13

## 2020-03-03 RX ORDER — BETAMETHASONE SODIUM PHOSPHATE AND BETAMETHASONE ACETATE 3; 3 MG/ML; MG/ML
6 INJECTION, SUSPENSION INTRA-ARTICULAR; INTRALESIONAL; INTRAMUSCULAR; SOFT TISSUE
Status: COMPLETED | OUTPATIENT
Start: 2020-03-03 | End: 2020-03-03

## 2020-03-03 RX ORDER — FLUTICASONE PROPIONATE 50 MCG
1 SPRAY, SUSPENSION (ML) NASAL DAILY
Qty: 18.2 ML | Refills: 0 | Status: SHIPPED | OUTPATIENT
Start: 2020-03-03

## 2020-03-03 RX ORDER — ALBUTEROL SULFATE 90 UG/1
2 AEROSOL, METERED RESPIRATORY (INHALATION) EVERY 6 HOURS PRN
Qty: 18 G | Refills: 0 | Status: SHIPPED | OUTPATIENT
Start: 2020-03-03 | End: 2021-03-03

## 2020-03-03 RX ORDER — BENZONATATE 100 MG/1
100 CAPSULE ORAL 3 TIMES DAILY PRN
Qty: 30 CAPSULE | Refills: 0 | Status: SHIPPED | OUTPATIENT
Start: 2020-03-03 | End: 2020-03-13

## 2020-03-03 RX ADMIN — BETAMETHASONE SODIUM PHOSPHATE AND BETAMETHASONE ACETATE 6 MG: 3; 3 INJECTION, SUSPENSION INTRA-ARTICULAR; INTRALESIONAL; INTRAMUSCULAR; SOFT TISSUE at 09:03

## 2020-03-03 NOTE — PATIENT INSTRUCTIONS
General Instructions for Viral URI:    Below are suggestions for symptomatic relief:              -Tylenol every 4 hours OR ibuprofen every 6 hours as needed for pain/fever.              -Salt water gargles to soothe throat pain.              -Chloroseptic spray also helps to numb throat pain.              -Nasal saline spray reduces inflammation and dryness.              -Warm face compresses to help with facial sinus pain/pressure.              -Vicks vapor rub at night.              -Flonase OTC or Nasacort OTC for nasal congestion.              -Simple foods like chicken noodle soup.              -Delsym helps with coughing at night              -Zyrtec/Claritin during the day & Benadryl at night may help with allergies.                If you DO NOT have Hypertension or any history of palpitations, it is ok to take over the counter Sudafed or Mucinex D or Allegra-D or Claritin-D or Zyrtec-D.  If you do take one of the above, it is ok to combine that with plain over the counter Mucinex or Allegra or Claritin or Zyrtec. If, for example, you are taking Zyrtec -D, you can combine that with Mucinex, but not Mucinex-D.  If you are taking Mucinex-D, you can combine that with plain Allegra or Claritin or Zyrtec.   If you DO have Hypertension or palpitations, it is safe to take Coricidin HBP for relief of sinus symptoms.     Please follow up with your primary care provider within 2-5 days if your signs and symptoms have not resolved or worsen.      If your condition worsens or fails to improve we recommend that you receive another evaluation at the emergency room immediately or contact your primary medical clinic to discuss your concerns.   You must understand that you have received an Urgent Care treatment only and that you may be released before all of your medical problems are known or treated. You, the patient, will arrange for follow up care as instructed.       Bronchitis, Viral (Adult)    You have a viral  bronchitis. Bronchitis is inflammation and swelling of the lining of the lungs. This is often caused by an infection. Symptoms include a dry, hacking cough that is worse at night. The cough may bring up yellow-green mucus. You may also feel short of breath or wheeze. Other symptoms may include tiredness, chest discomfort, and chills.  Bronchitis that is caused by a virus is not treated with antibiotics. Instead, medicines may be given to help relieve symptoms. Symptoms can last up to 2 weeks, although the cough may last much longer.  This illness is contagious during the first few days and is spread through the air by coughing and sneezing, or by direct contact (touching the sick person and then touching your own eyes, nose, or mouth).  Most viral illnesses resolve within 10 to 14 days with rest and simple home remedies, although they may sometimes last for several weeks.  Home care  · If symptoms are severe, rest at home for the first 2 to 3 days. When you go back to your usual activities, don't let yourself get too tired.  · Do not smoke. Also avoid being exposed to secondhand smoke.  · You may use over-the-counter medicine to control fever or pain, unless another pain medicine was prescribed. (Note: If you have chronic liver or kidney disease or have ever had a stomach ulcer or gastrointestinal bleeding, talk with your healthcare provider before using these medicines. Also talk to your provider if you are taking medicine to prevent blood clots.) Aspirin should never be given to anyone younger than 18 years of age who is ill with a viral infection or fever. It may cause severe liver or brain damage.  · Your appetite may be poor, so a light diet is fine. Avoid dehydration by drinking 6 to 8 glasses of fluids per day (such as water, soft drinks, sports drinks, juices, tea, or soup). Extra fluids will help loosen secretions in the nose and lungs.  · Over-the-counter cough, cold, and sore-throat medicines will not  shorten the length of the illness, but they may help to reduce symptoms. (Note: Do not use decongestants if you have high blood pressure.)  Follow-up care  Follow up with your healthcare provider, or as advised. If you had an X-ray or ECG (electrocardiogram), a specialist will review it. You will be notified of any new findings that may affect your care.  Note: If you are age 65 or older, or if you have a chronic lung disease or condition that affects your immune system, or you smoke, talk to your healthcare provider about having pneumococcal vaccinations and a yearly influenza vaccination (flu shot).  When to seek medical advice  Call your healthcare provider right away if any of these occur:  · Fever of 100.4°F (38°C) or higher  · Coughing up increased amounts of colored sputum  · Weakness, drowsiness, headache, facial pain, ear pain, or a stiff neck  Call 911, or get immediate medical care  Contact emergency services right away if any of these occur:  · Coughing up blood  · Worsening weakness, drowsiness, headache, or stiff neck  · Trouble breathing, wheezing, or pain with breathing  Date Last Reviewed: 9/13/2015  © 5268-6733 [employer]. [address] [place] [address], [place] [address]. All rights reserved. This information is not intended as a substitute for professional medical care. Always follow your healthcare professional's instructions.

## 2020-03-03 NOTE — PROGRESS NOTES
Subjective:       Patient ID: Reema Barrios is a 44 y.o. female.    Vitals:  weight is 103.4 kg (228 lb). Her temperature is 97.8 °F (36.6 °C). Her blood pressure is 139/104 (abnormal) and her pulse is 89. Her oxygen saturation is 96%.     Chief Complaint: URI    Pt states that she started with flu like symptoms about 5 days now while at [facility]. Pt states that she took mucinex cold / flu otc with no improvement.    URI    This is a new problem. The current episode started in the past 7 days. The problem has been unchanged. There has been no fever. Associated symptoms include congestion, coughing, ear pain, headaches, rhinorrhea, sneezing and a sore throat. Pertinent negatives include no nausea, rash, sinus pain, vomiting or wheezing. She has tried decongestant for the symptoms. The treatment provided no relief.       Constitution: Positive for chills, fatigue and fever. Negative for sweating.   HENT: Positive for ear pain, congestion and sore throat. Negative for sinus pain, sinus pressure and voice change.    Neck: Negative for painful lymph nodes.   Eyes: Negative for eye redness.   Respiratory: Positive for cough, sputum production and shortness of breath. Negative for chest tightness, bloody sputum, COPD, stridor, wheezing and asthma.    Gastrointestinal: Negative for nausea and vomiting.   Musculoskeletal: Positive for muscle ache.   Skin: Negative for rash.   Allergic/Immunologic: Positive for sneezing. Negative for seasonal allergies and asthma.   Neurological: Positive for headaches.   Hematologic/Lymphatic: Negative for swollen lymph nodes.       Objective:      Physical Exam   Constitutional: She is oriented to person, place, and time. She appears well-developed and well-nourished. She is cooperative.  Non-toxic appearance. She does not have a sickly appearance. She does not appear ill. No distress.   HENT:   Head: Normocephalic and atraumatic.   Right Ear: Hearing, external ear and ear canal  normal. Tympanic membrane is scarred and bulging.   Left Ear: Hearing, external ear and ear canal normal. Tympanic membrane is scarred and bulging.   Nose: Mucosal edema and rhinorrhea present. No nasal deformity. No epistaxis. Right sinus exhibits maxillary sinus tenderness. Right sinus exhibits no frontal sinus tenderness. Left sinus exhibits maxillary sinus tenderness. Left sinus exhibits no frontal sinus tenderness.   Mouth/Throat: Uvula is midline and mucous membranes are normal. No trismus in the jaw. Normal dentition. No uvula swelling. Posterior oropharyngeal edema and posterior oropharyngeal erythema present. No oropharyngeal exudate.   Eyes: Conjunctivae and lids are normal. No scleral icterus.   Neck: Trachea normal, full passive range of motion without pain and phonation normal. Neck supple. No neck rigidity. No edema and no erythema present.   Cardiovascular: Normal rate, regular rhythm, normal heart sounds, intact distal pulses and normal pulses.   Pulmonary/Chest: Effort normal and breath sounds normal. No respiratory distress. She has no decreased breath sounds. She has no wheezes. She has no rhonchi.   Abdominal: Normal appearance.   Musculoskeletal: Normal range of motion. She exhibits no edema or deformity.   Neurological: She is alert and oriented to person, place, and time. She exhibits normal muscle tone. Coordination normal.   Skin: Skin is warm, dry, intact, not diaphoretic and not pale.   Psychiatric: She has a normal mood and affect. Her speech is normal and behavior is normal. Judgment and thought content normal. Cognition and memory are normal.   Nursing note and vitals reviewed.        Recent Results (from the past 48 hour(s))   POCT Influenza A/B    Collection Time: 03/03/20  9:17 AM   Result Value Ref Range    Rapid Influenza A Ag Negative Negative    Rapid Influenza B Ag Negative Negative     Acceptable Yes    ]    Assessment:       1. Acute bronchitis, unspecified  organism    2. Chills        Plan:         Acute bronchitis, unspecified organism  -     betamethasone acetate-betamethasone sodium phosphate injection 6 mg  -     benzonatate (TESSALON) 100 MG capsule; Take 1 capsule (100 mg total) by mouth 3 (three) times daily as needed.  Dispense: 30 capsule; Refill: 0  -     promethazine-dextromethorphan (PROMETHAZINE-DM) 6.25-15 mg/5 mL Syrp; Take 5 mLs by mouth nightly as needed (cough).  Dispense: 150 mL; Refill: 0  -     fluticasone propionate (FLONASE) 50 mcg/actuation nasal spray; 1 spray (50 mcg total) by Each Nostril route once daily.  Dispense: 18.2 mL; Refill: 0  -     albuterol (PROVENTIL/VENTOLIN HFA) 90 mcg/actuation inhaler; Inhale 2 puffs into the lungs every 6 (six) hours as needed. Rescue  Dispense: 18 g; Refill: 0    Chills  -     POCT Influenza A/B      Patient Instructions   General Instructions for Viral URI:    Below are suggestions for symptomatic relief:              -Tylenol every 4 hours OR ibuprofen every 6 hours as needed for pain/fever.              -Salt water gargles to soothe throat pain.              -Chloroseptic spray also helps to numb throat pain.              -Nasal saline spray reduces inflammation and dryness.              -Warm face compresses to help with facial sinus pain/pressure.              -Vicks vapor rub at night.              -Flonase OTC or Nasacort OTC for nasal congestion.              -Simple foods like chicken noodle soup.              -Delsym helps with coughing at night              -Zyrtec/Claritin during the day & Benadryl at night may help with allergies.                If you DO NOT have Hypertension or any history of palpitations, it is ok to take over the counter Sudafed or Mucinex D or Allegra-D or Claritin-D or Zyrtec-D.  If you do take one of the above, it is ok to combine that with plain over the counter Mucinex or Allegra or Claritin or Zyrtec. If, for example, you are taking Zyrtec -D, you can combine that  with Mucinex, but not Mucinex-D.  If you are taking Mucinex-D, you can combine that with plain Allegra or Claritin or Zyrtec.   If you DO have Hypertension or palpitations, it is safe to take Coricidin HBP for relief of sinus symptoms.     Please follow up with your primary care provider within 2-5 days if your signs and symptoms have not resolved or worsen.      If your condition worsens or fails to improve we recommend that you receive another evaluation at the emergency room immediately or contact your primary medical clinic to discuss your concerns.   You must understand that you have received an Urgent Care treatment only and that you may be released before all of your medical problems are known or treated. You, the patient, will arrange for follow up care as instructed.       Bronchitis, Viral (Adult)    You have a viral bronchitis. Bronchitis is inflammation and swelling of the lining of the lungs. This is often caused by an infection. Symptoms include a dry, hacking cough that is worse at night. The cough may bring up yellow-green mucus. You may also feel short of breath or wheeze. Other symptoms may include tiredness, chest discomfort, and chills.  Bronchitis that is caused by a virus is not treated with antibiotics. Instead, medicines may be given to help relieve symptoms. Symptoms can last up to 2 weeks, although the cough may last much longer.  This illness is contagious during the first few days and is spread through the air by coughing and sneezing, or by direct contact (touching the sick person and then touching your own eyes, nose, or mouth).  Most viral illnesses resolve within 10 to 14 days with rest and simple home remedies, although they may sometimes last for several weeks.  Home care  · If symptoms are severe, rest at home for the first 2 to 3 days. When you go back to your usual activities, don't let yourself get too tired.  · Do not smoke. Also avoid being exposed to secondhand smoke.  · You  may use over-the-counter medicine to control fever or pain, unless another pain medicine was prescribed. (Note: If you have chronic liver or kidney disease or have ever had a stomach ulcer or gastrointestinal bleeding, talk with your healthcare provider before using these medicines. Also talk to your provider if you are taking medicine to prevent blood clots.) Aspirin should never be given to anyone younger than 18 years of age who is ill with a viral infection or fever. It may cause severe liver or brain damage.  · Your appetite may be poor, so a light diet is fine. Avoid dehydration by drinking 6 to 8 glasses of fluids per day (such as water, soft drinks, sports drinks, juices, tea, or soup). Extra fluids will help loosen secretions in the nose and lungs.  · Over-the-counter cough, cold, and sore-throat medicines will not shorten the length of the illness, but they may help to reduce symptoms. (Note: Do not use decongestants if you have high blood pressure.)  Follow-up care  Follow up with your healthcare provider, or as advised. If you had an X-ray or ECG (electrocardiogram), a specialist will review it. You will be notified of any new findings that may affect your care.  Note: If you are age 65 or older, or if you have a chronic lung disease or condition that affects your immune system, or you smoke, talk to your healthcare provider about having pneumococcal vaccinations and a yearly influenza vaccination (flu shot).  When to seek medical advice  Call your healthcare provider right away if any of these occur:  · Fever of 100.4°F (38°C) or higher  · Coughing up increased amounts of colored sputum  · Weakness, drowsiness, headache, facial pain, ear pain, or a stiff neck  Call 911, or get immediate medical care  Contact emergency services right away if any of these occur:  · Coughing up blood  · Worsening weakness, drowsiness, headache, or stiff neck  · Trouble breathing, wheezing, or pain with breathing  Date Last  Reviewed: 9/13/2015  © 1807-9579 [employer]. [address] [place] [address], [place] [address]. All rights reserved. This information is not intended as a substitute for professional medical care. Always follow your healthcare professional's instructions.

## 2020-03-24 DIAGNOSIS — J20.9 ACUTE BRONCHITIS, UNSPECIFIED ORGANISM: ICD-10-CM

## 2020-03-25 RX ORDER — FLUTICASONE PROPIONATE 50 MCG
SPRAY, SUSPENSION (ML) NASAL
Qty: 16 ML | Refills: 0 | OUTPATIENT
Start: 2020-03-25

## 2021-12-23 ENCOUNTER — PATIENT MESSAGE (OUTPATIENT)
Dept: ADMINISTRATIVE | Facility: OTHER | Age: 46
End: 2021-12-23
Payer: COMMERCIAL

## 2022-10-06 ENCOUNTER — TELEPHONE (OUTPATIENT)
Dept: RHEUMATOLOGY | Facility: CLINIC | Age: 47
End: 2022-10-06
Payer: COMMERCIAL

## 2022-10-06 NOTE — TELEPHONE ENCOUNTER
Spoke with the patient and let her know the first available would be in [facility] and she agreed. I place the patient reminder letter in the mail   Name band;

## 2024-08-14 ENCOUNTER — PATIENT MESSAGE (OUTPATIENT)
Dept: HEPATOLOGY | Facility: CLINIC | Age: 49
End: 2024-08-14

## 2024-08-14 ENCOUNTER — OFFICE VISIT (OUTPATIENT)
Dept: HEPATOLOGY | Facility: CLINIC | Age: 49
End: 2024-08-14
Payer: COMMERCIAL

## 2024-08-14 VITALS — BODY MASS INDEX: 25.74 KG/M2 | HEIGHT: 67 IN | WEIGHT: 164 LBS

## 2024-08-14 DIAGNOSIS — E78.5 HYPERLIPIDEMIA, UNSPECIFIED HYPERLIPIDEMIA TYPE: ICD-10-CM

## 2024-08-14 DIAGNOSIS — R76.8 POSITIVE ANA (ANTINUCLEAR ANTIBODY): ICD-10-CM

## 2024-08-14 DIAGNOSIS — K75.81 METABOLIC DYSFUNCTION-ASSOCIATED STEATOHEPATITIS (MASH): Primary | ICD-10-CM

## 2024-08-14 DIAGNOSIS — Z83.79 FAMILY HISTORY OF LIVER DISEASE: ICD-10-CM

## 2024-08-14 DIAGNOSIS — K74.01 EARLY HEPATIC FIBROSIS: ICD-10-CM

## 2024-08-14 DIAGNOSIS — I10 ESSENTIAL HYPERTENSION: ICD-10-CM

## 2024-08-14 DIAGNOSIS — Z86.39 HISTORY OF OBESITY: ICD-10-CM

## 2024-08-14 DIAGNOSIS — R74.8 ELEVATED LIVER ENZYMES: ICD-10-CM

## 2024-08-14 PROBLEM — R73.02 IMPAIRED GLUCOSE TOLERANCE: Status: ACTIVE | Noted: 2022-04-04

## 2024-08-14 PROBLEM — Z90.710 ABSENCE OF BOTH CERVIX AND UTERUS, ACQUIRED: Status: ACTIVE | Noted: 2019-11-04

## 2024-08-14 PROBLEM — G47.00 INSOMNIA: Status: ACTIVE | Noted: 2019-01-30

## 2024-08-14 PROBLEM — Z87.19 H/O GASTROESOPHAGEAL REFLUX (GERD): Status: ACTIVE | Noted: 2024-01-18

## 2024-08-14 PROBLEM — E89.40 SURGICAL MENOPAUSE: Status: ACTIVE | Noted: 2019-11-04

## 2024-08-14 PROBLEM — F98.8 ATTENTION DEFICIT DISORDER (ADD) WITHOUT HYPERACTIVITY: Status: ACTIVE | Noted: 2022-04-04

## 2024-08-14 PROBLEM — Z79.890 CURRENT LONG-TERM USE OF POSTMENOPAUSAL HORMONE REPLACEMENT THERAPY: Status: ACTIVE | Noted: 2019-11-04

## 2024-08-14 PROBLEM — Q44.1 GALLBLADDER ANOMALY: Status: ACTIVE | Noted: 2019-01-30

## 2024-08-14 PROBLEM — F41.9 ANXIETY: Status: ACTIVE | Noted: 2019-01-30

## 2024-08-14 PROCEDURE — 1160F RVW MEDS BY RX/DR IN RCRD: CPT | Mod: CPTII,95,, | Performed by: NURSE PRACTITIONER

## 2024-08-14 PROCEDURE — 1159F MED LIST DOCD IN RCRD: CPT | Mod: CPTII,95,, | Performed by: NURSE PRACTITIONER

## 2024-08-14 PROCEDURE — 3008F BODY MASS INDEX DOCD: CPT | Mod: CPTII,95,, | Performed by: NURSE PRACTITIONER

## 2024-08-14 PROCEDURE — 99203 OFFICE O/P NEW LOW 30 MIN: CPT | Mod: 95,,, | Performed by: NURSE PRACTITIONER

## 2024-08-14 RX ORDER — OMEPRAZOLE 20 MG/1
1 CAPSULE, DELAYED RELEASE ORAL DAILY
COMMUNITY
Start: 2023-11-27

## 2024-08-14 RX ORDER — TIRZEPATIDE 12.5 MG/.5ML
12.5 INJECTION, SOLUTION SUBCUTANEOUS
COMMUNITY
Start: 2024-07-18

## 2024-08-14 RX ORDER — DEXTROAMPHETAMINE SACCHARATE, AMPHETAMINE ASPARTATE MONOHYDRATE, DEXTROAMPHETAMINE SULFATE AND AMPHETAMINE SULFATE 2.5; 2.5; 2.5; 2.5 MG/1; MG/1; MG/1; MG/1
10 CAPSULE, EXTENDED RELEASE ORAL
COMMUNITY
Start: 2024-07-18

## 2024-08-14 RX ORDER — ATORVASTATIN CALCIUM 20 MG/1
20 TABLET, FILM COATED ORAL EVERY MORNING
COMMUNITY
Start: 2024-07-14

## 2024-08-14 RX ORDER — HYDROCODONE BITARTRATE AND ACETAMINOPHEN 7.5; 325 MG/1; MG/1
1 TABLET ORAL EVERY 4 HOURS PRN
COMMUNITY
Start: 2024-03-28

## 2024-08-14 RX ORDER — ESTRADIOL 10 UG/1
1 INSERT VAGINAL
COMMUNITY

## 2024-08-14 RX ORDER — ACETAMINOPHEN 325 MG/1
325 TABLET ORAL EVERY 4 HOURS PRN
COMMUNITY
Start: 2024-03-28

## 2024-08-14 RX ORDER — LOSARTAN POTASSIUM AND HYDROCHLOROTHIAZIDE 25; 100 MG/1; MG/1
1 TABLET ORAL DAILY
COMMUNITY
Start: 2020-03-01

## 2024-08-14 RX ORDER — IBUPROFEN 800 MG/1
800 TABLET ORAL 3 TIMES DAILY
COMMUNITY
Start: 2024-06-27

## 2024-08-14 NOTE — PROGRESS NOTES
The patient location is: [place]  The chief complaint leading to consultation is: Fatty liver    Visit type: audiovisual    Face to Face time with patient: 26  30 minutes of total time spent on the encounter, which includes face to face time and non-face to face time preparing to see the patient (eg, review of tests), Obtaining and/or reviewing separately obtained history, Documenting clinical information in the electronic or other health record, Independently interpreting results (not separately reported) and communicating results to the patient/family/caregiver, or Care coordination (not separately reported).     Each patient to whom he or she provides medical services by telemedicine is:  (1) informed of the relationship between the physician and patient and the respective role of any other health care provider with respect to management of the patient; and (2) notified that he or she may decline to receive medical services by telemedicine and may withdraw from such care at any time.    Notes:     [facility] New Patient Visit    Reason for Visit:  Fatty Liver    PCP: Dakotah Sandy    HPI:  This is a 49 y.o. female with PMH noted below, here for evaluation of fatty liver.    The patient's risk factors for fatty liver disease include:     Overweight/Obesity                     Yes; History of obesity - previously treated with Mounjaro, but discontinued use in July, due to insurance denial. BMI now 25.69   Dyslipidemia                                Yes; Well controlled on Atorvastatin 20 mg daily - Refer to most recent lipid panel results below:   Latest Reference Range & Units 07/07/23 11:04   Cholesterol Total 100 - 200 mg/dL 194 (E)   HDL 40 - 75 mg/dL 52 (E)   HDL/Cholesterol Ratio  3.73 (E)   LDL Calculated 0 - 125 mg/dL 123 (E)   Triglycerides 30 - 150 mg/dL 121 (E)     Insulin resistance/Diabetes         No; No HgbA1c on file.    She has had mildly elevated liver enzymes in a  hepatocellular pattern since at least 8/2019, based on review of outside records. She was previously diagnosed with MASLD/MASH by an outside GI provider in 2022.     Abdominal CT in 7/2022 showed a normal appearing liver and spleen, as below:    Findings:     Limited imaging of the inferior thorax demonstrates no pleural or pericardial effusion. The heart size is normal. The lung bases are normal.     Abdomen:     The evaluation of the visceral structures is limited without intravenous contrast.     The liver, spleen, and pancreas are normal.   The gallbladder is surgically absent. There is no biliary ductal.   The adrenal glands are normal. The kidneys are normal.     The stomach and duodenum are normal. The upper abdominal mesenteric small bowel is normal. There are distal colonic diverticula.   There is no upper abdominal ascites.     Pelvis:   There is diverticulosis of the sigmoid colon. There are mild pericolonic inflammatory changes adjacent to the proximal sigmoid colon in the anterior left hemipelvis consistent with acute diverticulitis. There is no extraluminal gas or abscess.     The urinary bladder is normal.   The uterus is surgically absent. The adnexa are normal.   The rectum is normal.   The appendix is normal.   There is a fat-containing umbilical hernia.     Impression    1.   Acute sigmoid diverticulitis. There is no abscess or perforation.  2.   The appendix is jaden    She has never undergone a liver biopsy. Prior Fibroscan in 2022 (done through [facility]) was suggestive of F2 fibrosis, per patient report.     Family history is significant for Father with a history of alcoholic cirrhosis, and a Paternal Aunt and Uncle diagnosed with David Disease. She denies any history of heavy alcohol intake and does not use illicit drugs. HCV and HIV negative on prior labs. She is well appearing, and has no signs or symptoms of decompensated liver disease. Of note, she does have a history of +  ESCOBAR, and is scheduled to see Rheumatology on Monday. Further ROS as below.     PMHX:  has a past medical history of Anxiety, DDD (degenerative disc disease), cervical, Depression, Heartburn, High cholesterol, Hypertension, Insomnia, and PCOS (polycystic ovarian syndrome).    PSHX:  has a past surgical history that includes Carpal tunnel release (Bilateral);  section; Tubal ligation; and Inguinal hernia repair (Bilateral, ).    The patient's social and family histories were reviewed by me and updated in the appropriate section of the electronic medical record.    Review of patient's allergies indicates:   Allergen Reactions    Percocet [oxycodone-acetaminophen] Itching     Current Outpatient Medications on File Prior to Visit   Medication Sig Dispense Refill    acetaminophen (TYLENOL) 325 MG tablet Take 325 mg by mouth every 4 (four) hours as needed.      atorvastatin (LIPITOR) 20 MG tablet Take 20 mg by mouth every morning.      dextroamphetamine-amphetamine (ADDERALL XR) 10 MG 24 hr capsule Take 10 mg by mouth.      HYDROcodone-acetaminophen (NORCO) 7.5-325 mg per tablet Take 1 tablet by mouth every 4 (four) hours as needed.      ibuprofen (ADVIL,MOTRIN) 800 MG tablet Take 800 mg by mouth 3 (three) times daily.      losartan-hydrochlorothiazide 100-25 mg (HYZAAR) 100-25 mg per tablet Take 1 tablet by mouth once daily.      MOUNJARO 12.5 mg/0.5 mL PnIj Inject 12.5 mg into the skin.      omeprazole (PRILOSEC) 20 MG capsule Take 1 capsule by mouth once daily.      escitalopram oxalate (LEXAPRO) 20 MG tablet Take 20 mg by mouth once daily.      VAGIFEM 10 mcg Tab Place 1 tablet vaginally twice a week.      zolpidem (AMBIEN) 10 mg Tab Take 10 mg by mouth every evening.       [DISCONTINUED] albuterol (PROVENTIL/VENTOLIN HFA) 90 mcg/actuation inhaler Inhale 2 puffs into the lungs every 6 (six) hours as needed. Rescue 18 g 0    [DISCONTINUED] atorvastatin (LIPITOR) 10 MG tablet Take 10 mg by mouth once daily.       [DISCONTINUED] cetirizine (ZYRTEC) 5 MG tablet Take 5 mg by mouth once daily.      [DISCONTINUED] fluticasone propionate (FLONASE) 50 mcg/actuation nasal spray 1 spray (50 mcg total) by Each Nostril route once daily. 18.2 mL 0    [DISCONTINUED] losartan (COZAAR) 50 MG tablet 50 mg once daily.       [DISCONTINUED] metFORMIN (GLUCOPHAGE-XR) 750 MG 24 hr tablet       [DISCONTINUED] omeprazole (PRILOSEC) 20 MG capsule Take 20 mg by mouth daily as needed.       [DISCONTINUED] rOPINIRole (REQUIP) 1 MG tablet Take 1 tablet (1 mg total) by mouth every evening. 30 tablet 1     No current facility-administered medications on file prior to visit.     SOCIAL HISTORY:   Social History     Tobacco Use   Smoking Status Never   Smokeless Tobacco Never     Social History     Substance and Sexual Activity   Alcohol Use No     Social History     Substance and Sexual Activity   Drug Use No     ROS:   GENERAL: Denies fever, chills,  + intentional weight loss, + fatigue + joint pain, + chronic sores in the nose and mouth, + dry eyes and mouth - history of + ESCOBAR - scheduled to see Rheumatology  HEENT: Denies headaches, dizziness, vision/hearing changes  CARDIOVASCULAR: Denies chest pain, palpitations, or edema  RESPIRATORY: Denies dyspnea, cough  GI: Denies nausea, vomiting. No change in bowel pattern or color + Intermittent RUQ pain and rectal bleeding (history of hemorrhoids).   : Denies dysuria, hematuria   SKIN: + itching   NEURO: Denies confusion, memory loss, or mood changes  PSYCH: Denies depression or anxiety  HEME/LYMPH: + easy bruising or bleeding    Objective Findings:    PHYSICAL EXAM:   Friendly White female, in no acute distress; alert and oriented to person, place and time  VITALS: There were no vitals taken for this visit. (Not done - Telehealth visit).   HENT: Normocephalic, without obvious abnormality.   EYES: Sclerae anicteric.   NECK: No obvious masses.  CARDIOVASCULAR: SUNIL.  RESPIRATORY: Normal respiratory  effort.   GI: SUNIL.  EXTREMITIES: SUNIL.  SKIN: No jaundice. No rashes noted to exposed skin.   NEURO: No asterixis.  PSYCH:  Memory intact. Thought and speech pattern appropriate. Behavior normal. No depression or anxiety noted.    DIAGNOSTIC STUDIES:        CT ABDOMEN PELVIS WITHOUT CONTRAST 7/3/2022:    Findings:     Limited imaging of the inferior thorax demonstrates no pleural or pericardial effusion. The heart size is normal. The lung bases are normal.     Abdomen:   The evaluation of the visceral structures is limited without intravenous contrast.     The liver, spleen, and pancreas are normal.   The gallbladder is surgically absent. There is no biliary ductal.   The adrenal glands are normal. The kidneys are normal.     The stomach and duodenum are normal. The upper abdominal mesenteric small bowel is normal. There are distal colonic diverticula.   There is no upper abdominal ascites.     Pelvis:   There is diverticulosis of the sigmoid colon. There are mild pericolonic inflammatory changes adjacent to the proximal sigmoid colon in the anterior left hemipelvis consistent with acute diverticulitis. There is no extraluminal gas or abscess.     The urinary bladder is normal.   The uterus is surgically absent. The adnexa are normal.   The rectum is normal.   The appendix is normal.   There is a fat-containing umbilical hernia.     Impression    1.   Acute sigmoid diverticulitis. There is no abscess or perforation.  2.   The appendix is normal.    FIBROSCAN - Ordered at visit.    EDUCATION:  Per AVS.    ASSESSMENT & PLAN:  49 y.o. White female with:    1. Metabolic dysfunction-associated steatohepatitis (MASH)  FibroScan Transplant Hepatology(Vibration Controlled Transient Elastography)    Hepatic Function Panel    Ceruloplasmin    Hepatitis A antibody, IgG    Hepatitis B Core Antibody, Total    Hepatitis B Surface Antibody, Qual/Quant    Hepatitis B Surface Antigen      2. Early hepatic fibrosis        3. Elevated  liver enzymes  FibroScan Transplant Hepatology(Vibration Controlled Transient Elastography)    Hepatic Function Panel    Ceruloplasmin    Hepatitis A antibody, IgG    Hepatitis B Core Antibody, Total    Hepatitis B Surface Antibody, Qual/Quant    Hepatitis B Surface Antigen      4. History of obesity  FibroScan Transplant Hepatology(Vibration Controlled Transient Elastography)    Hepatic Function Panel    Ceruloplasmin    Hepatitis A antibody, IgG    Hepatitis B Core Antibody, Total    Hepatitis B Surface Antibody, Qual/Quant    Hepatitis B Surface Antigen      5. Hyperlipidemia, unspecified hyperlipidemia type  FibroScan Transplant Hepatology(Vibration Controlled Transient Elastography)    Hepatic Function Panel    Ceruloplasmin    Hepatitis A antibody, IgG    Hepatitis B Core Antibody, Total    Hepatitis B Surface Antibody, Qual/Quant    Hepatitis B Surface Antigen      6. Essential hypertension  FibroScan Transplant Hepatology(Vibration Controlled Transient Elastography)    Hepatic Function Panel    Ceruloplasmin    Hepatitis A antibody, IgG    Hepatitis B Core Antibody, Total    Hepatitis B Surface Antibody, Qual/Quant    Hepatitis B Surface Antigen      7. Family history of liver disease        8. Positive ESCOBAR (antinuclear antibody)  FibroScan Transplant Hepatology(Vibration Controlled Transient Elastography)    Hepatic Function Panel    Ceruloplasmin    Hepatitis A antibody, IgG    Hepatitis B Core Antibody, Total    Hepatitis B Surface Antibody, Qual/Quant    Hepatitis B Surface Antigen        - Schedule Fibroscan to non-invasively stage liver disease.  - Repeat liver function tests now. Will also check Ceruloplasmin level given family history of WD.  - Will also check titer levels for Hepatitis A and B with next lab draw, and arrange for prophylactic vaccination if clinically warranted.  - Maintain a healthy weight, through diet and exercise.  - Recommend good control of cholesterol, blood pressure, & blood  sugar levels.  - Limit alcohol intake to no more than 5-7 standard drinks per week, ideally less.  - Avoid herbal supplements/alternative remedies.  - Proceed with Rheumatology appointment as scheduled for further evaluation of + ESCOBAR.  - Return to clinic to be determined by lab and Fibroscan results.     Thank you for allowing me to participate in the care of Reema Barrios       Hepatology Nurse Practitioner  [facility]    CC'ed note to:   No ref. provider found  Dakotah Sandy MD

## 2024-08-19 ENCOUNTER — HOSPITAL ENCOUNTER (OUTPATIENT)
Dept: RADIOLOGY | Facility: HOSPITAL | Age: 49
Discharge: HOME | End: 2024-08-19
Attending: INTERNAL MEDICINE
Payer: COMMERCIAL

## 2024-08-19 ENCOUNTER — OFFICE VISIT (OUTPATIENT)
Dept: RHEUMATOLOGY | Facility: CLINIC | Age: 49
End: 2024-08-19
Payer: COMMERCIAL

## 2024-08-19 ENCOUNTER — PROCEDURE VISIT (OUTPATIENT)
Dept: HEPATOLOGY | Facility: CLINIC | Age: 49
End: 2024-08-19
Payer: COMMERCIAL

## 2024-08-19 ENCOUNTER — PATIENT MESSAGE (OUTPATIENT)
Dept: HEPATOLOGY | Facility: CLINIC | Age: 49
End: 2024-08-19

## 2024-08-19 VITALS
HEIGHT: 67 IN | DIASTOLIC BLOOD PRESSURE: 76 MMHG | WEIGHT: 171.31 LBS | SYSTOLIC BLOOD PRESSURE: 115 MMHG | HEART RATE: 72 BPM | BODY MASS INDEX: 26.89 KG/M2

## 2024-08-19 DIAGNOSIS — E78.5 HYPERLIPIDEMIA, UNSPECIFIED HYPERLIPIDEMIA TYPE: ICD-10-CM

## 2024-08-19 DIAGNOSIS — R06.02 SHORTNESS OF BREATH: ICD-10-CM

## 2024-08-19 DIAGNOSIS — M25.50 POLYARTHRALGIA: ICD-10-CM

## 2024-08-19 DIAGNOSIS — R68.2 DRY MOUTH: Primary | ICD-10-CM

## 2024-08-19 DIAGNOSIS — R74.8 ELEVATED LIVER ENZYMES: ICD-10-CM

## 2024-08-19 DIAGNOSIS — R21 RASH AND OTHER NONSPECIFIC SKIN ERUPTION: ICD-10-CM

## 2024-08-19 DIAGNOSIS — R76.8 POSITIVE ANA (ANTINUCLEAR ANTIBODY): ICD-10-CM

## 2024-08-19 DIAGNOSIS — K75.81 METABOLIC DYSFUNCTION-ASSOCIATED STEATOHEPATITIS (MASH): ICD-10-CM

## 2024-08-19 DIAGNOSIS — I10 ESSENTIAL HYPERTENSION: ICD-10-CM

## 2024-08-19 DIAGNOSIS — Z86.39 HISTORY OF OBESITY: ICD-10-CM

## 2024-08-19 PROCEDURE — 77077 JOINT SURVEY SINGLE VIEW: CPT | Mod: 26,,, | Performed by: RADIOLOGY

## 2024-08-19 PROCEDURE — 99205 OFFICE O/P NEW HI 60 MIN: CPT | Mod: S$GLB,,, | Performed by: INTERNAL MEDICINE

## 2024-08-19 PROCEDURE — 3074F SYST BP LT 130 MM HG: CPT | Mod: CPTII,S$GLB,, | Performed by: INTERNAL MEDICINE

## 2024-08-19 PROCEDURE — 3078F DIAST BP <80 MM HG: CPT | Mod: CPTII,S$GLB,, | Performed by: INTERNAL MEDICINE

## 2024-08-19 PROCEDURE — 72202 X-RAY EXAM SI JOINTS 3/> VWS: CPT | Mod: 26,,, | Performed by: RADIOLOGY

## 2024-08-19 PROCEDURE — 72202 X-RAY EXAM SI JOINTS 3/> VWS: CPT | Mod: TC

## 2024-08-19 PROCEDURE — 99999 PR PBB SHADOW E&M-EST. PATIENT-LVL V: CPT | Mod: PBBFAC,,, | Performed by: INTERNAL MEDICINE

## 2024-08-19 PROCEDURE — 77077 JOINT SURVEY SINGLE VIEW: CPT | Mod: TC

## 2024-08-19 PROCEDURE — 3008F BODY MASS INDEX DOCD: CPT | Mod: CPTII,S$GLB,, | Performed by: INTERNAL MEDICINE

## 2024-08-19 PROCEDURE — 91200 LIVER ELASTOGRAPHY: CPT | Mod: S$GLB,,, | Performed by: NURSE PRACTITIONER

## 2024-08-19 PROCEDURE — 72100 X-RAY EXAM L-S SPINE 2/3 VWS: CPT | Mod: 26,,, | Performed by: RADIOLOGY

## 2024-08-19 PROCEDURE — 72100 X-RAY EXAM L-S SPINE 2/3 VWS: CPT | Mod: TC

## 2024-08-19 PROCEDURE — 71046 X-RAY EXAM CHEST 2 VIEWS: CPT | Mod: TC

## 2024-08-19 PROCEDURE — 71046 X-RAY EXAM CHEST 2 VIEWS: CPT | Mod: 26,,, | Performed by: RADIOLOGY

## 2024-08-19 PROCEDURE — 1159F MED LIST DOCD IN RCRD: CPT | Mod: CPTII,S$GLB,, | Performed by: INTERNAL MEDICINE

## 2024-08-19 NOTE — PROCEDURES
FibroScan Transplant Hepatology(Vibration Controlled Transient Elastography)    Date/Time: 8/19/2024 11:45 AM    Performed by: Sayra Villarreal NP  Authorized by: Sayra Villarreal NP    Diagnosis:  NAFLD    Probe:  M    Universal Protocol: Patient's identity, procedure and site were verified, confirmatory pause was performed.  Discussed procedure including risks and potential complications.  Questions answered.  Patient verbalizes understanding and wishes to proceed with VCTE.     Procedure: After providing explanations of the procedure, patient was placed in the supine position with right arm in maximum abduction to allow optimal exposure of right lateral abdomen.  Patient was briefly assessed, Testing was performed in the mid-axillary location, 50Hz Shear Wave pulses were applied and the resulting Shear Wave and Propagation Speed detected with a 3.5 MHz ultrasonic signal, using the FibroScan probe, Skin to liver capsule distance and liver parenchyma were accessed during the entire examination with the FibroScan probe, Patient was instructed to breathe normally and to abstain from sudden movements during the procedure, allowing for random measurements of liver stiffness. At least 10 Shear Waves were produced, Individual measurements of each Shear Wave were calculated.  Patient tolerated the procedure well with no complications.  Meets discharge criteria as was dismissed.  Rates pain 0 out of 10.  Patient will follow up with ordering provider to review results.    Findings  Median liver stiffness score:  4.6  CAP Reading: dB/m:  157    IQR/med %:  10  Interpretation  Fibrosis interpretation is based on medial liver stiffness - Kilopascal (kPa).    Fibrosis Stage:  F 0-1  Steatosis interpretation is based on controlled attenuation parameter - (dB/m).    Steatosis Grade:  <S1

## 2024-08-19 NOTE — PROGRESS NOTES
8/13/2024    11:06 AM   Rapid3 Question Responses and Scores   MDHAQ Score 1.3   Psychologic Score 2.2   Pain Score 6.5   When you awakened in the morning OVER THE LAST WEEK, did you feel stiff? Yes   If Yes, please indicate the number of hours until you are as limber as you will be for the day 2.5   Fatigue Score 8   Global Health Score 3.5   RAPID3 Score 4.78     Answers submitted by the patient for this visit:  Rheumatology Questionnaire (Submitted on 8/13/2024)  fever: No  eye redness: Yes  mouth sores: Yes  headaches: Yes  shortness of breath: Yes  chest pain: No  trouble swallowing: Yes  diarrhea: Yes  constipation: Yes  unexpected weight change: No  genital sore: No  dysuria: Yes  During the last 3 days, have you had a skin rash?: Yes  Bruises or bleeds easily: Yes  cough: No

## 2024-08-19 NOTE — PROGRESS NOTES
"Subjective:      Patient ID: Reema Barrios is a 49 y.o. female.    Chief Complaint: Disease Management    HPI   49 year  F  with PMH of HTN, anxiety,PCOS, GOETZ, CTS release surgeries here for evaluation for +Linda.   Reports fatigue for 9 months.  Reports dry eyes and dry mouth. Sometimes it makes it hard to swallow.Reports extreme dry skin.   Reports sores in nose and mouth for 1 year.   Reports nose rash for about 1. 5 years.  She has pain in SI joint and  the hips.  She also has pain in lower back, ankles,neck, and hands for 3 years. Denies joint swelling.  Reports she is stiff in ankles in the hips.  Denies personal or family hx of psoriasis.  Reports hair thinning.  Body hurt to the touch.  Fingers may turn purple but not all the time.   Denies smoking.  Reports GERD.      Family hx: uncle: Wilsons disease      Past Medical History:   Diagnosis Date    Anxiety     DDD (degenerative disc disease), cervical     Depression     Heartburn     about every 2 weeks    High cholesterol     Hypertension     Insomnia     PCOS (polycystic ovarian syndrome)        Review of Systems   Constitutional:  Negative for fever and unexpected weight change.   HENT:  Positive for mouth sores and trouble swallowing.    Eyes:  Positive for redness.   Respiratory:  Positive for shortness of breath. Negative for cough.    Cardiovascular:  Negative for chest pain.   Gastrointestinal:  Positive for constipation and diarrhea.   Genitourinary:  Positive for dysuria. Negative for genital sores.   Skin:  Positive for rash.   Neurological:  Positive for headaches.   Hematological:  Bruises/bleeds easily.    see HPI      Objective:   /76   Pulse 72   Ht 5' 7" (1.702 m)   Wt 77.7 kg (171 lb 4.8 oz)   BMI 26.83 kg/m²   Physical Exam   Constitutional: She is oriented to person, place, and time.   HENT:   Head: Normocephalic and atraumatic.   Right Ear: External ear normal.   Left Ear: External ear normal.   Nose: Nose normal. "   Mouth/Throat: Oropharynx is clear and moist. No oropharyngeal exudate.   Eyes: Pupils are equal, round, and reactive to light. Conjunctivae are normal. Right eye exhibits no discharge. Left eye exhibits no discharge. No scleral icterus.   Neck: No JVD present. No thyromegaly present.   Cardiovascular: Normal rate, regular rhythm and normal heart sounds. Exam reveals no gallop and no friction rub.   No murmur heard.  Pulmonary/Chest: Effort normal and breath sounds normal. No respiratory distress. She has no wheezes. She has no rales. She exhibits no tenderness.   Abdominal: Soft. Bowel sounds are normal. She exhibits no distension and no mass. There is no abdominal tenderness. There is no rebound and no guarding.   Musculoskeletal:         General: Tenderness present.      Cervical back: Neck supple.   Lymphadenopathy:     She has no cervical adenopathy.   Neurological: She is alert and oriented to person, place, and time. No cranial nerve deficit. Gait normal. Coordination normal.   Skin: Skin is dry. No rash noted. No erythema. No pallor.   Psychiatric: Affect and judgment normal.         No data to display     Assessment:    49 year  F  with PMH of HTN, anxiety,PCOS, GOETZ, CTS release surgeries here for evaluation for +Escobar, joint pain, and sicca symptoms.    No diagnosis found.      Plan:     Problem List Items Addressed This Visit    None    # Joint pain: given her symptoms  will work her up for  inflammatory arthritis.  Given +ESCOBAR, arthralgias, oral ulcers, and ? Raynauds disease, SLE is in my differential.  -Consider mri of SI joints and hands  -labs  Xrays      # Nose rash: discussed with patient I am unclear if it is malar rash vs rosacea.  Derm consult  # Sicca symptoms: Sjogrens studies negative.  Labs  ENT consult for bx    # Fibromyalgia: she has diffuse body pain.  -discussed sleep clinic consult but she will hold off at this time      # SOB: chronic.  Chest xray      60* minutes of total time spent  on the encounter, which includes face to face time and non-face to face time preparing to see the patient (eg, review of tests), Obtaining and/or reviewing separately obtained history, Documenting clinical information in the electronic or other health record, Independently interpreting results (not separately reported) and communicating results to the patient/family/caregiver, or Care coordination (not separately reported).

## 2024-08-20 ENCOUNTER — PATIENT MESSAGE (OUTPATIENT)
Dept: RHEUMATOLOGY | Facility: CLINIC | Age: 49
End: 2024-08-20
Payer: COMMERCIAL

## 2024-08-23 ENCOUNTER — PATIENT MESSAGE (OUTPATIENT)
Dept: RHEUMATOLOGY | Facility: CLINIC | Age: 49
End: 2024-08-23
Payer: COMMERCIAL

## 2024-08-23 DIAGNOSIS — M53.3 PAIN OF BOTH SACROILIAC JOINTS: ICD-10-CM

## 2024-08-23 DIAGNOSIS — M79.641 BILATERAL HAND PAIN: Primary | ICD-10-CM

## 2024-08-23 DIAGNOSIS — M79.89 BILATERAL HAND SWELLING: ICD-10-CM

## 2024-08-23 DIAGNOSIS — M79.642 BILATERAL HAND PAIN: Primary | ICD-10-CM

## 2024-09-12 ENCOUNTER — OFFICE VISIT (OUTPATIENT)
Dept: OTOLARYNGOLOGY | Facility: CLINIC | Age: 49
End: 2024-09-12
Payer: COMMERCIAL

## 2024-09-12 ENCOUNTER — TELEPHONE (OUTPATIENT)
Dept: OTOLARYNGOLOGY | Facility: CLINIC | Age: 49
End: 2024-09-12

## 2024-09-12 VITALS
SYSTOLIC BLOOD PRESSURE: 116 MMHG | DIASTOLIC BLOOD PRESSURE: 74 MMHG | BODY MASS INDEX: 26.89 KG/M2 | WEIGHT: 171.31 LBS | HEIGHT: 67 IN

## 2024-09-12 DIAGNOSIS — Z98.890 S/P BIOPSY: ICD-10-CM

## 2024-09-12 DIAGNOSIS — R68.2 DRY MOUTH: Primary | ICD-10-CM

## 2024-09-12 DIAGNOSIS — H04.129 DRY EYE: ICD-10-CM

## 2024-09-12 PROCEDURE — 42405 BIOPSY OF SALIVARY GLAND: CPT | Mod: S$GLB,,, | Performed by: OTOLARYNGOLOGY

## 2024-09-12 PROCEDURE — 3008F BODY MASS INDEX DOCD: CPT | Mod: CPTII,S$GLB,, | Performed by: OTOLARYNGOLOGY

## 2024-09-12 PROCEDURE — 3074F SYST BP LT 130 MM HG: CPT | Mod: CPTII,S$GLB,, | Performed by: OTOLARYNGOLOGY

## 2024-09-12 PROCEDURE — 3078F DIAST BP <80 MM HG: CPT | Mod: CPTII,S$GLB,, | Performed by: OTOLARYNGOLOGY

## 2024-09-12 PROCEDURE — 99204 OFFICE O/P NEW MOD 45 MIN: CPT | Mod: 25,S$GLB,, | Performed by: OTOLARYNGOLOGY

## 2024-09-12 RX ORDER — HYDROCODONE BITARTRATE AND ACETAMINOPHEN 5; 325 MG/1; MG/1
1 TABLET ORAL EVERY 6 HOURS PRN
Qty: 10 TABLET | Refills: 0 | Status: SHIPPED | OUTPATIENT
Start: 2024-09-12

## 2024-09-12 NOTE — TELEPHONE ENCOUNTER
----- Message from Manju Davis sent at 9/12/2024 11:44 AM CDT -----  Contact: self  Type:  Needs Medical Advice    Who Called: Pt Mom Clair  Symptoms (please be specific):  Pain after biopsy done 9/12  Pharmacy name and phone #:     [id number] - [place], [place] - [address]  [address] [address]  Phone: [phone] Fax: [phone]  Would the patient rather a call back or a response via [facility]?  Call   Best Call Back Number: Please contact Pt Mom Clair [phone]  Additional Information: Pt Mom would like to know if something can be called in for pain...  Please call to advise... Thank you...

## 2024-09-12 NOTE — PROGRESS NOTES
OTOLARYNGOLOGY CLINIC NOTE  Date:  2024     Chief complaint:  Chief Complaint   Patient presents with    dry mouth     Getting work up done right now by rheumatologist       History of Present Illness  Reeam Barrios is a 49 y.o. female  presenting today for a new evaluation and treatment of dry mouth   Seen rhuem -  notes reviewed- requesting biopsy as labs negative.     Has had dry mouth for 1 year ; she is unsure if worsening.   Has dry eye as well - has not seen ophtho     Patient reports she had a positive ESCOBAR which prompted referral to rheumatologist  Past Medical History  Past Medical History:   Diagnosis Date    Anxiety     DDD (degenerative disc disease), cervical     Depression     Heartburn     about every 2 weeks    High cholesterol     Hypertension     Insomnia     PCOS (polycystic ovarian syndrome)         Past Surgical History  Past Surgical History:   Procedure Laterality Date    CARPAL TUNNEL RELEASE Bilateral      SECTION      x2    INGUINAL HERNIA REPAIR Bilateral     TUBAL LIGATION          Medications  Current Outpatient Medications on File Prior to Visit   Medication Sig Dispense Refill    atorvastatin (LIPITOR) 20 MG tablet Take 20 mg by mouth every morning.      dextroamphetamine-amphetamine (ADDERALL XR) 10 MG 24 hr capsule Take 10 mg by mouth.      escitalopram oxalate (LEXAPRO) 20 MG tablet Take 20 mg by mouth once daily.      losartan-hydrochlorothiazide 100-25 mg (HYZAAR) 100-25 mg per tablet Take 1 tablet by mouth once daily.      MOUNJARO 12.5 mg/0.5 mL PnIj Inject 12.5 mg into the skin.      VAGIFEM 10 mcg Tab Place 1 tablet vaginally twice a week.      zolpidem (AMBIEN) 10 mg Tab Take 10 mg by mouth every evening.       acetaminophen (TYLENOL) 325 MG tablet Take 325 mg by mouth every 4 (four) hours as needed.      HYDROcodone-acetaminophen (NORCO) 7.5-325 mg per tablet Take 1 tablet by mouth every 4 (four) hours as needed.      ibuprofen (ADVIL,MOTRIN) 800  "MG tablet Take 800 mg by mouth 3 (three) times daily.      omeprazole (PRILOSEC) 20 MG capsule Take 1 capsule by mouth once daily.       No current facility-administered medications on file prior to visit.       Review of Systems  Review of Systems   Constitutional:  Positive for chills and malaise/fatigue.   HENT:  Positive for ear pain and hearing loss.    Eyes:  Positive for photophobia.   Respiratory:  Positive for shortness of breath.    Cardiovascular:  Positive for chest pain.   Gastrointestinal:  Positive for abdominal pain, constipation, diarrhea and heartburn.   Musculoskeletal:  Positive for back pain and neck pain.   Skin:  Positive for rash.   Neurological:  Positive for dizziness, tremors and headaches.   Endo/Heme/Allergies:  Bruises/bleeds easily.    Answers submitted by the patient for this visit:  Review of Symptoms Questionnaire  (Submitted on 9/5/2024)  facial swelling: Yes  mouth sores: Yes  Tooth/Dental Problems?: Yes  trouble swallowing: Yes  eye itching: Yes  Irregular heartbeat?: Yes  Foot swelling?: Yes  Acid Reflux?: Yes  Urinating too frequently?: Yes  Muscle aches / pain?: Yes  Cold all of the time? : Yes  Light-headedness: Yes  decreased concentration: Yes    Social History   reports that she has never smoked. She has never used smokeless tobacco. She reports that she does not drink alcohol and does not use drugs.     Family History  Family History   Problem Relation Name Age of Onset    Hypertension Mother Clair     Hyperlipidemia Mother Clair     Cirrhosis Father      Heart disease Maternal Grandmother      Heart disease Maternal Grandfather      Tuberculosis Paternal Grandmother      Diabetes Paternal Grandfather          Physical Exam   Vitals:    09/12/24 1014   BP: 116/74    Body mass index is 26.83 kg/m².  Weight: 77.7 kg (171 lb 4.8 oz)   Height: 5' 7" (170.2 cm)     GENERAL: no acute distress.  HEAD: normocephalic.   EYES: lids and lashes normal. No scleral icterus  EARS: " external ear without lesion, normal pinna shape and position.    NOSE: external nose without significant bony abnormality  ORAL CAVITY/OROPHARYNX: tongue midline and mobile. Symmetric palate rise. Uvula midline. No lesions, saliva present  NECK: trachea midline.   LYMPH NODES:No cervical lymphadenopathy.  RESPIRATORY: no stridor, no stertor. Voice normal. Respirations nonlabored.  NEURO: alert, responds to questions appropriately.   PSYCH:mood appropriate    PROCEDURE NOTE  Procedure performed: biopsy of lower lip   Indication for procedure: evaluate for sjogrens syndrome  Pre-procedure diagnosis:dry mouth vs sjogrens  Post procedure diagnosis: same    Consent: patient counseled on risks/benefits/indications and alternative to biopsy including but not limited to need for additional treatment, biopsy returning as not diagnostic and need for repeat evaluation, bleeding, infection, pain. The patient elected to undergo procedure.     Procedure in detail : After consent was obtained and with patient in seated position, the lower lip on the right side was injected with 1% lidocaine with 1:100,000 epinephrine. After adequate time for vasoconstriction and anesthesia, A 15 blade and forceps were used to make an incision in the lower lip mucosa. Minor salivary gland tissue was noted. Approximately 4-6 minor salivary glands were excised using iris scissors and toothed forceps.  Specimen was placed in formalin to be sent for pathologic examination. Hemostasis was achieved with pressure and silver nitrate . Sutures were not required. The patient tolerated the procedure well without complication    Imaging:  The patient does not have any pertinent and/or recent imaging of the head and neck.     Labs:  CBC      BMP  Recent Labs   Lab 08/06/22  1059 01/20/24  0950 07/25/24  0727   Glucose 115 H 98 91   Sodium 140 140 137   Potassium 4.1 4.4 4.7   Carbon Dioxide 26 32 29   Blood Urea Nitrogen 13 14 25   Creatinine 1.09 H 1.04 H 1.03    Calcium 10.0 9.9 10.1     [facility]      [id number]  C3 121  C4 20  Rheumatoid factor less than 14  ESCOABR negative  Scleroderma less than 0.7 (negative)  Anti mckeon negative  [facility] 8-8-24          Assessment  1. Dry mouth  - Ambulatory referral/consult to ENT  - Specimen to Pathology ENT    2. S/P biopsy  - HYDROcodone-acetaminophen (NORCO) 5-325 mg per tablet; Take 1 tablet by mouth every 6 (six) hours as needed for Pain.  Dispense: 10 tablet; Refill: 0    3. Dry eye       Plan:  Discussed plan of care with patient in detail and all questions answered. Patient reported understanding of plan of care. I gave the patient the opportunity to ask questions and patient confirmed all questions answered to satisfaction.     Ibuprofen and tylenol regimen ; narcotic if severe pain  Counseled to avoid spicy food, listerine/mouthwash as will cause pain   Gave instructions of what to do if bleeding occurs and gave silver nitrate sticks  Counseled about sjogren syndrome    Recommend see ophtho for dry eye    F/u 10-14 days for wound check and path review      Please be aware that this note has been generated with the assistance of MMsergio voice-to-text.  Please excuse any spelling or grammatical errors.

## 2024-09-12 NOTE — TELEPHONE ENCOUNTER
Spoke with pt mother, informed pain med sent in , in allergy was listed but only caused itching, per Dr. Bhandari pt can take benadryl to help with that.  Mom verb understanding

## 2024-09-23 ENCOUNTER — OFFICE VISIT (OUTPATIENT)
Dept: OTOLARYNGOLOGY | Facility: CLINIC | Age: 49
End: 2024-09-23
Payer: COMMERCIAL

## 2024-09-23 VITALS
HEIGHT: 67 IN | SYSTOLIC BLOOD PRESSURE: 117 MMHG | BODY MASS INDEX: 26.69 KG/M2 | DIASTOLIC BLOOD PRESSURE: 82 MMHG | WEIGHT: 170.06 LBS

## 2024-09-23 DIAGNOSIS — R68.2 DRY MOUTH: Primary | ICD-10-CM

## 2024-09-23 DIAGNOSIS — Z98.890 S/P BIOPSY: ICD-10-CM

## 2024-09-23 PROCEDURE — 3008F BODY MASS INDEX DOCD: CPT | Mod: CPTII,S$GLB,, | Performed by: OTOLARYNGOLOGY

## 2024-09-23 PROCEDURE — 3079F DIAST BP 80-89 MM HG: CPT | Mod: CPTII,S$GLB,, | Performed by: OTOLARYNGOLOGY

## 2024-09-23 PROCEDURE — 1159F MED LIST DOCD IN RCRD: CPT | Mod: CPTII,S$GLB,, | Performed by: OTOLARYNGOLOGY

## 2024-09-23 PROCEDURE — 3074F SYST BP LT 130 MM HG: CPT | Mod: CPTII,S$GLB,, | Performed by: OTOLARYNGOLOGY

## 2024-09-23 PROCEDURE — 99212 OFFICE O/P EST SF 10 MIN: CPT | Mod: S$GLB,,, | Performed by: OTOLARYNGOLOGY

## 2024-09-23 RX ORDER — DEXTROAMPHETAMINE SACCHARATE, AMPHETAMINE ASPARTATE MONOHYDRATE, DEXTROAMPHETAMINE SULFATE AND AMPHETAMINE SULFATE 2.5; 2.5; 2.5; 2.5 MG/1; MG/1; MG/1; MG/1
10 CAPSULE, EXTENDED RELEASE ORAL
COMMUNITY
Start: 2024-09-03

## 2024-09-23 RX ORDER — ZOLPIDEM TARTRATE 10 MG/1
1 TABLET ORAL NIGHTLY
COMMUNITY
Start: 2024-08-22

## 2024-09-23 NOTE — PROGRESS NOTES
OTOLARYNGOLOGY CLINIC NOTE  Date:  2024     Chief complaint:  Chief Complaint   Patient presents with    Follow-up     1-2 week f/u-recheck lip biopsy        History of Present Illness  Reema Barrios is a 49 y.o. female  presenting today for a followup.  Has some numbness of top of lip near biopsy site but otherwise healing well       Past Medical History  Past Medical History:   Diagnosis Date    Anxiety     DDD (degenerative disc disease), cervical     Depression     Heartburn     about every 2 weeks    High cholesterol     Hypertension     Insomnia     PCOS (polycystic ovarian syndrome)         Past Surgical History  Past Surgical History:   Procedure Laterality Date    CARPAL TUNNEL RELEASE Bilateral      SECTION      x2    INGUINAL HERNIA REPAIR Bilateral     TUBAL LIGATION          Medications  Current Outpatient Medications on File Prior to Visit   Medication Sig Dispense Refill    atorvastatin (LIPITOR) 20 MG tablet Take 20 mg by mouth every morning.      dextroamphetamine-amphetamine (ADDERALL XR) 10 MG 24 hr capsule Take 10 mg by mouth.      escitalopram oxalate (LEXAPRO) 20 MG tablet Take 20 mg by mouth once daily.      HYDROcodone-acetaminophen (NORCO) 5-325 mg per tablet Take 1 tablet by mouth every 6 (six) hours as needed for Pain. 10 tablet 0    losartan-hydrochlorothiazide 100-25 mg (HYZAAR) 100-25 mg per tablet Take 1 tablet by mouth once daily.      MOUNJARO 12.5 mg/0.5 mL PnIj Inject 12.5 mg into the skin.      VAGIFEM 10 mcg Tab Place 1 tablet vaginally twice a week.      zolpidem (AMBIEN) 10 mg Tab Take 1 tablet by mouth every evening.      dextroamphetamine-amphetamine (ADDERALL XR) 10 MG 24 hr capsule Take 10 mg by mouth. (Patient not taking: Reported on 2024)      zolpidem (AMBIEN) 10 mg Tab Take 10 mg by mouth every evening.  (Patient not taking: Reported on 2024)      [DISCONTINUED] acetaminophen (TYLENOL) 325 MG tablet Take 325 mg by mouth every 4  "(four) hours as needed.      [DISCONTINUED] ibuprofen (ADVIL,MOTRIN) 800 MG tablet Take 800 mg by mouth 3 (three) times daily.      [DISCONTINUED] omeprazole (PRILOSEC) 20 MG capsule Take 1 capsule by mouth once daily.       No current facility-administered medications on file prior to visit.       Review of Systems  ROS     Social History   reports that she has never smoked. She has never used smokeless tobacco. She reports that she does not drink alcohol and does not use drugs.     Family History  Family History   Problem Relation Name Age of Onset    Hypertension Mother Clair     Hyperlipidemia Mother Clair     Cirrhosis Father      Heart disease Maternal Grandmother      Heart disease Maternal Grandfather      Tuberculosis Paternal Grandmother      Diabetes Paternal Grandfather          Physical Exam   Vitals:    09/23/24 1119   BP: 117/82    Body mass index is 26.64 kg/m².  Weight: 77.2 kg (170 lb 1.4 oz)   Height: 5' 7" (170.2 cm)     GENERAL: no acute distress.  HEAD: normocephalic.   EYES: No scleral icterus  EARS: external ear without lesion, normal pinna shape and position.  External auditory canal with normal cerumen, tympanic membrane fully visible, no perforation , no retraction. No middle ear effusion. Ossicles intact.   NOSE: external nose without significant bony abnormality  ORAL CAVITY/OROPHARYNX: tongue mobile. Lower lip biopsy site healing well   NECK: trachea midline.   RESPIRATORY: no stridor, no stertor. Voice normal. Respirations nonlabored.  NEURO: alert, responds to questions appropriately.  Sensation intact of chin/outside of cutaneous lip  PSYCH:mood appropriate      Imaging:  The patient does not have any new imaging of the head and neck since last visit.     Labs:  CBC      BMP  Recent Labs   Lab 08/06/22  1059 01/20/24  0950 07/25/24  0727   Glucose 115 H 98 91   Sodium 140 140 137   Potassium 4.1 4.4 4.7   Carbon Dioxide 26 32 29   Blood Urea Nitrogen 13 14 25   Creatinine 1.09 H 1.04 " H 1.03   Calcium 10.0 9.9 10.1     COAGS        Assessment  1. Dry mouth    2. S/P biopsy       Plan:  Discussed plan of care with patient in detail and all questions answered. Patient reported understanding of plan of care. I gave the patient the opportunity to ask questions and patient confirmed all questions answered to satisfaction.     Path pending - can message me in [employer]hart if has questions when results come in - offered to sschedule f/u for a couple weeks to review vs message, she prefers the latter   Numbness should improve over next several weeks-months  Rec f/u with rheumatology  Healing well     F/u prn      Please be aware that this note has been generated with the assistance of MMsergio voice-to-text.  Please excuse any spelling or grammatical errors.

## 2024-09-24 ENCOUNTER — PATIENT MESSAGE (OUTPATIENT)
Dept: OTOLARYNGOLOGY | Facility: CLINIC | Age: 49
End: 2024-09-24
Payer: COMMERCIAL

## 2024-09-26 ENCOUNTER — PATIENT MESSAGE (OUTPATIENT)
Dept: RHEUMATOLOGY | Facility: CLINIC | Age: 49
End: 2024-09-26
Payer: COMMERCIAL

## 2024-10-17 ENCOUNTER — OFFICE VISIT (OUTPATIENT)
Dept: RHEUMATOLOGY | Facility: CLINIC | Age: 49
End: 2024-10-17
Payer: COMMERCIAL

## 2024-10-17 ENCOUNTER — PATIENT MESSAGE (OUTPATIENT)
Dept: RHEUMATOLOGY | Facility: CLINIC | Age: 49
End: 2024-10-17

## 2024-10-17 DIAGNOSIS — M35.01 SJOGREN'S SYNDROME WITH KERATOCONJUNCTIVITIS SICCA: Primary | ICD-10-CM

## 2024-10-17 DIAGNOSIS — M79.7 PRIMARY FIBROMYALGIA: ICD-10-CM

## 2024-10-17 DIAGNOSIS — R21 RASH: ICD-10-CM

## 2024-10-17 PROCEDURE — 1159F MED LIST DOCD IN RCRD: CPT | Mod: CPTII,95,, | Performed by: INTERNAL MEDICINE

## 2024-10-17 PROCEDURE — 99215 OFFICE O/P EST HI 40 MIN: CPT | Mod: 95,,, | Performed by: INTERNAL MEDICINE

## 2024-10-17 RX ORDER — HYDROXYCHLOROQUINE SULFATE 200 MG/1
200 TABLET, FILM COATED ORAL 2 TIMES DAILY
Qty: 30 TABLET | Refills: 11 | Status: SHIPPED | OUTPATIENT
Start: 2024-10-17

## 2024-10-17 RX ORDER — PILOCARPINE HYDROCHLORIDE 5 MG/1
5 TABLET, FILM COATED ORAL 3 TIMES DAILY
Qty: 90 TABLET | Refills: 5 | Status: SHIPPED | OUTPATIENT
Start: 2024-10-17 | End: 2024-11-16

## 2024-10-17 NOTE — PROGRESS NOTES
"Subjective:      Patient ID: Reema Barrios is a 49 y.o. female.    Chief Complaint: Disease Management    HPI   49 year  F  with PMH of HTN, anxiety,PCOS, GOETZ, CTS release surgeries here for evaluation for +Linda.   Reports fatigue for 9 months.  Reports dry eyes and dry mouth. Sometimes it makes it hard to swallow.Reports extreme dry skin.   Reports sores in nose and mouth for 1 year.   Reports nose rash for about 1. 5 years.  She has pain in SI joint and  the hips.  She also has pain in lower back, ankles,neck, and hands for 3 years. Denies joint swelling.  Reports she is stiff in ankles in the hips.  Denies personal or family hx of psoriasis.  Reports hair thinning.  Body hurt to the touch.  Fingers may turn purple but not all the time.   Denies smoking.  Reports GERD.      Family hx: uncle: Wilsons disease     Interval history: she had recent lip biopsy consistent with Sjogrens disease.  She continues to have severe dry mouth.      Past Medical History:   Diagnosis Date    Anxiety     DDD (degenerative disc disease), cervical     Depression     Heartburn     about every 2 weeks    High cholesterol     Hypertension     Insomnia     PCOS (polycystic ovarian syndrome)        Review of Systems: see HPI    Objective:   /76   Pulse 72   Ht 5' 7" (1.702 m)   Wt 77.7 kg (171 lb 4.8 oz)   BMI 26.83 kg/m²   Physical Exam   Constitutional: She is oriented to person, place, and time.   HENT:   Head: Normocephalic and atraumatic.   Right Ear: External ear normal.   Left Ear: External ear normal.   Nose: Nose normal.   Mouth/Throat: Oropharynx is clear and moist. No oropharyngeal exudate.   Eyes: Pupils are equal, round, and reactive to light. Conjunctivae are normal. Right eye exhibits no discharge. Left eye exhibits no discharge. No scleral icterus.   Neck: No JVD present. No thyromegaly present.   Cardiovascular: Normal rate, regular rhythm and normal heart sounds. Exam reveals no gallop and no friction rub. "   No murmur heard.  Pulmonary/Chest: Effort normal and breath sounds normal. No respiratory distress. She has no wheezes. She has no rales. She exhibits no tenderness.   Abdominal: Soft. Bowel sounds are normal. She exhibits no distension and no mass. There is no abdominal tenderness. There is no rebound and no guarding.   Musculoskeletal:         General: Tenderness present.      Cervical back: Neck supple.   Lymphadenopathy:     She has no cervical adenopathy.   Neurological: She is alert and oriented to person, place, and time. No cranial nerve deficit. Gait normal. Coordination normal.   Skin: Skin is dry. No rash noted. No erythema. No pallor.   Psychiatric: Affect and judgment normal.         No data to display     Assessment:    49 year  F  with PMH of HTN, anxiety,PCOS, GOETZ, CTS release surgeries here for evaluation for +Escobar, joint pain, and sicca symptoms.    No diagnosis found.      Plan:     Problem List Items Addressed This Visit    None    # Sjogren's syndrome: She has  +ESCOBAR, arthralgias, oral ulcers, and  Raynauds disease. Lip biopsy consistent with Sjogren's disease.  Discussed with patient she has some features of SLE like Raynauds and oral ulcers so we will monitor this.  Will start her on plaquenil given her arthralgias.  -start plaquenil 200mg po BID (Risks of starting plaquenil discussed. Risks include eye toxicity and agrees on timely follow up with optho to avoid risks of eye toxicity. Other risks include rashes such has hyperpigmentation and vertigo.  -start pilocarpine 5 mg po BID      # Nose rash: discussed with patient I am unclear if it is malar rash vs rosacea.  Derm consult    # Fibromyalgia: she has diffuse body pain.  -discussed sleep clinic consult but she will hold off at this time      45 * minutes of total time spent on the encounter, which includes face to face time and non-face to face time preparing to see the patient (eg, review of tests), Obtaining and/or reviewing separately  obtained history, Documenting clinical information in the electronic or other health record, Independently interpreting results (not separately reported) and communicating results to the patient/family/caregiver, or Care coordination (not separately reported).

## 2024-10-17 NOTE — PROGRESS NOTES
10/15/2024    11:00 AM   Rapid3 Question Responses and Scores   MDHAQ Score 1.5   Psychologic Score 6.6   Pain Score 4   When you awakened in the morning OVER THE LAST WEEK, did you feel stiff? Yes   If Yes, please indicate the number of hours until you are as limber as you will be for the day 4   Fatigue Score 5   Global Health Score 6   RAPID3 Score 5     Answers submitted by the patient for this visit:  Rheumatology Questionnaire (Submitted on 10/15/2024)  fever: Yes  eye redness: Yes  mouth sores: Yes  headaches: Yes  shortness of breath: Yes  chest pain: Yes  trouble swallowing: Yes  diarrhea: No  constipation: Yes  unexpected weight change: No  genital sore: No  dysuria: No  During the last 3 days, have you had a skin rash?: Yes  Bruises or bleeds easily: Yes  cough: Yes

## 2024-10-24 ENCOUNTER — PATIENT MESSAGE (OUTPATIENT)
Dept: RHEUMATOLOGY | Facility: CLINIC | Age: 49
End: 2024-10-24
Payer: COMMERCIAL

## 2024-10-24 ENCOUNTER — LAB VISIT (OUTPATIENT)
Dept: LAB | Facility: HOSPITAL | Age: 49
End: 2024-10-24
Attending: INTERNAL MEDICINE
Payer: COMMERCIAL

## 2024-10-24 DIAGNOSIS — M53.3 PAIN OF BOTH SACROILIAC JOINTS: ICD-10-CM

## 2024-10-24 LAB
ALBUMIN SERPL BCP-MCNC: 4.3 G/DL (ref 3.5–5.2)
ALBUMIN SERPL BCP-MCNC: 4.3 G/DL (ref 3.5–5.2)
ALP SERPL-CCNC: 66 U/L (ref 40–150)
ALP SERPL-CCNC: 66 U/L (ref 40–150)
ALT SERPL W/O P-5'-P-CCNC: 23 U/L (ref 10–44)
ALT SERPL W/O P-5'-P-CCNC: 23 U/L (ref 10–44)
ANION GAP SERPL CALC-SCNC: 11 MMOL/L (ref 8–16)
ANION GAP SERPL CALC-SCNC: 11 MMOL/L (ref 8–16)
AST SERPL-CCNC: 28 U/L (ref 10–40)
AST SERPL-CCNC: 28 U/L (ref 10–40)
BASOPHILS # BLD AUTO: 0.07 K/UL (ref 0–0.2)
BASOPHILS NFR BLD: 0.9 % (ref 0–1.9)
BILIRUB SERPL-MCNC: 0.4 MG/DL (ref 0.1–1)
BILIRUB SERPL-MCNC: 0.4 MG/DL (ref 0.1–1)
BUN SERPL-MCNC: 10 MG/DL (ref 6–20)
BUN SERPL-MCNC: 10 MG/DL (ref 6–20)
C3 SERPL-MCNC: 129 MG/DL (ref 50–180)
C4 SERPL-MCNC: 20 MG/DL (ref 11–44)
CALCIUM SERPL-MCNC: 10.1 MG/DL (ref 8.7–10.5)
CALCIUM SERPL-MCNC: 10.1 MG/DL (ref 8.7–10.5)
CHLORIDE SERPL-SCNC: 101 MMOL/L (ref 95–110)
CHLORIDE SERPL-SCNC: 101 MMOL/L (ref 95–110)
CO2 SERPL-SCNC: 29 MMOL/L (ref 23–29)
CO2 SERPL-SCNC: 29 MMOL/L (ref 23–29)
CREAT SERPL-MCNC: 1 MG/DL (ref 0.5–1.4)
CREAT SERPL-MCNC: 1 MG/DL (ref 0.5–1.4)
CRP SERPL-MCNC: 2.1 MG/L (ref 0–8.2)
DIFFERENTIAL METHOD BLD: NORMAL
EOSINOPHIL # BLD AUTO: 0.3 K/UL (ref 0–0.5)
EOSINOPHIL NFR BLD: 3.4 % (ref 0–8)
ERYTHROCYTE [DISTWIDTH] IN BLOOD BY AUTOMATED COUNT: 12.1 % (ref 11.5–14.5)
ERYTHROCYTE [SEDIMENTATION RATE] IN BLOOD BY PHOTOMETRIC METHOD: 4 MM/HR (ref 0–36)
EST. GFR  (NO RACE VARIABLE): >60 ML/MIN/1.73 M^2
EST. GFR  (NO RACE VARIABLE): >60 ML/MIN/1.73 M^2
GLUCOSE SERPL-MCNC: 93 MG/DL (ref 70–110)
GLUCOSE SERPL-MCNC: 93 MG/DL (ref 70–110)
HCT VFR BLD AUTO: 41.7 % (ref 37–48.5)
HCV AB SERPL QL IA: NORMAL
HGB BLD-MCNC: 14.2 G/DL (ref 12–16)
HIV 1+2 AB+HIV1 P24 AG SERPL QL IA: NORMAL
IMM GRANULOCYTES # BLD AUTO: 0.02 K/UL (ref 0–0.04)
IMM GRANULOCYTES NFR BLD AUTO: 0.2 % (ref 0–0.5)
LYMPHOCYTES # BLD AUTO: 1.8 K/UL (ref 1–4.8)
LYMPHOCYTES NFR BLD: 22.1 % (ref 18–48)
MCH RBC QN AUTO: 30.5 PG (ref 27–31)
MCHC RBC AUTO-ENTMCNC: 34.1 G/DL (ref 32–36)
MCV RBC AUTO: 90 FL (ref 82–98)
MONOCYTES # BLD AUTO: 0.4 K/UL (ref 0.3–1)
MONOCYTES NFR BLD: 4.8 % (ref 4–15)
NEUTROPHILS # BLD AUTO: 5.6 K/UL (ref 1.8–7.7)
NEUTROPHILS NFR BLD: 68.6 % (ref 38–73)
NRBC BLD-RTO: 0 /100 WBC
PLATELET # BLD AUTO: 361 K/UL (ref 150–450)
PMV BLD AUTO: 10.2 FL (ref 9.2–12.9)
POTASSIUM SERPL-SCNC: 3.5 MMOL/L (ref 3.5–5.1)
POTASSIUM SERPL-SCNC: 3.5 MMOL/L (ref 3.5–5.1)
PROT SERPL-MCNC: 8 G/DL (ref 6–8.4)
PROT SERPL-MCNC: 8 G/DL (ref 6–8.4)
RBC # BLD AUTO: 4.66 M/UL (ref 4–5.4)
SODIUM SERPL-SCNC: 141 MMOL/L (ref 136–145)
SODIUM SERPL-SCNC: 141 MMOL/L (ref 136–145)
WBC # BLD AUTO: 8.2 K/UL (ref 3.9–12.7)

## 2024-10-24 PROCEDURE — 87389 HIV-1 AG W/HIV-1&-2 AB AG IA: CPT | Performed by: INTERNAL MEDICINE

## 2024-10-24 PROCEDURE — 86160 COMPLEMENT ANTIGEN: CPT | Performed by: INTERNAL MEDICINE

## 2024-10-24 PROCEDURE — 85652 RBC SED RATE AUTOMATED: CPT | Performed by: INTERNAL MEDICINE

## 2024-10-24 PROCEDURE — 82787 IGG 1 2 3 OR 4 EACH: CPT | Performed by: INTERNAL MEDICINE

## 2024-10-24 PROCEDURE — 86803 HEPATITIS C AB TEST: CPT | Performed by: INTERNAL MEDICINE

## 2024-10-24 PROCEDURE — 85025 COMPLETE CBC W/AUTO DIFF WBC: CPT | Performed by: INTERNAL MEDICINE

## 2024-10-24 PROCEDURE — 80053 COMPREHEN METABOLIC PANEL: CPT | Performed by: INTERNAL MEDICINE

## 2024-10-24 PROCEDURE — 86160 COMPLEMENT ANTIGEN: CPT | Mod: 59 | Performed by: INTERNAL MEDICINE

## 2024-10-24 PROCEDURE — 36415 COLL VENOUS BLD VENIPUNCTURE: CPT | Performed by: INTERNAL MEDICINE

## 2024-10-24 PROCEDURE — 86038 ANTINUCLEAR ANTIBODIES: CPT | Performed by: INTERNAL MEDICINE

## 2024-10-24 PROCEDURE — 86235 NUCLEAR ANTIGEN ANTIBODY: CPT | Performed by: INTERNAL MEDICINE

## 2024-10-24 PROCEDURE — 82164 ANGIOTENSIN I ENZYME TEST: CPT | Performed by: INTERNAL MEDICINE

## 2024-10-24 PROCEDURE — 86140 C-REACTIVE PROTEIN: CPT | Performed by: INTERNAL MEDICINE

## 2024-10-25 LAB
ACE SERPL-CCNC: 39 U/L (ref 16–85)
ANA SER QL IF: NORMAL

## 2024-10-28 LAB
ANTI-SSA ANTIBODY: 0.07 RATIO (ref 0–0.99)
ANTI-SSA INTERPRETATION: NEGATIVE
IGG1 SER-MCNC: 527 MG/DL (ref 382–929)
IGG2 SER-MCNC: 456 MG/DL (ref 242–700)
IGG3 SER-MCNC: 66 MG/DL (ref 22–176)
IGG4 SER-MCNC: 33 MG/DL (ref 4–86)

## 2025-04-17 ENCOUNTER — PATIENT MESSAGE (OUTPATIENT)
Dept: RHEUMATOLOGY | Facility: CLINIC | Age: 50
End: 2025-04-17
Payer: COMMERCIAL

## 2025-08-14 ENCOUNTER — OFFICE VISIT (OUTPATIENT)
Dept: NEUROLOGY | Facility: CLINIC | Age: 50
End: 2025-08-14
Payer: COMMERCIAL

## 2025-08-14 VITALS
OXYGEN SATURATION: 96 % | WEIGHT: 183.44 LBS | SYSTOLIC BLOOD PRESSURE: 120 MMHG | HEIGHT: 67 IN | BODY MASS INDEX: 28.79 KG/M2 | HEART RATE: 87 BPM | DIASTOLIC BLOOD PRESSURE: 86 MMHG

## 2025-08-14 DIAGNOSIS — R55 RECURRENT SYNCOPE: ICD-10-CM

## 2025-08-14 DIAGNOSIS — R55 SYNCOPE AND COLLAPSE: ICD-10-CM

## 2025-08-14 DIAGNOSIS — R42 DIZZINESS: Primary | ICD-10-CM

## 2025-08-14 PROCEDURE — 1159F MED LIST DOCD IN RCRD: CPT | Mod: CPTII,S$GLB,, | Performed by: PSYCHIATRY & NEUROLOGY

## 2025-08-14 PROCEDURE — 3079F DIAST BP 80-89 MM HG: CPT | Mod: CPTII,S$GLB,, | Performed by: PSYCHIATRY & NEUROLOGY

## 2025-08-14 PROCEDURE — 3074F SYST BP LT 130 MM HG: CPT | Mod: CPTII,S$GLB,, | Performed by: PSYCHIATRY & NEUROLOGY

## 2025-08-14 PROCEDURE — 99204 OFFICE O/P NEW MOD 45 MIN: CPT | Mod: S$GLB,,, | Performed by: PSYCHIATRY & NEUROLOGY

## 2025-08-14 PROCEDURE — 3008F BODY MASS INDEX DOCD: CPT | Mod: CPTII,S$GLB,, | Performed by: PSYCHIATRY & NEUROLOGY

## 2025-08-14 PROCEDURE — 99999 PR PBB SHADOW E&M-EST. PATIENT-LVL IV: CPT | Mod: PBBFAC,,, | Performed by: PSYCHIATRY & NEUROLOGY

## 2025-08-21 ENCOUNTER — TELEPHONE (OUTPATIENT)
Dept: NEUROLOGY | Facility: CLINIC | Age: 50
End: 2025-08-21
Payer: COMMERCIAL

## 2025-08-21 DIAGNOSIS — R55 SYNCOPE, UNSPECIFIED SYNCOPE TYPE: Primary | ICD-10-CM

## 2025-08-21 RX ORDER — LORAZEPAM 1 MG/1
TABLET ORAL
Qty: 1 TABLET | Refills: 0 | Status: SHIPPED | OUTPATIENT
Start: 2025-08-21

## 2025-08-22 ENCOUNTER — TELEPHONE (OUTPATIENT)
Dept: NEUROLOGY | Facility: HOSPITAL | Age: 50
End: 2025-08-22
Payer: COMMERCIAL